# Patient Record
Sex: FEMALE | Race: WHITE | Employment: FULL TIME | URBAN - METROPOLITAN AREA
[De-identification: names, ages, dates, MRNs, and addresses within clinical notes are randomized per-mention and may not be internally consistent; named-entity substitution may affect disease eponyms.]

---

## 2017-01-04 ENCOUNTER — OFFICE VISIT (OUTPATIENT)
Dept: FAMILY MEDICINE CLINIC | Age: 57
End: 2017-01-04

## 2017-01-04 VITALS
DIASTOLIC BLOOD PRESSURE: 95 MMHG | TEMPERATURE: 96.4 F | OXYGEN SATURATION: 96 % | BODY MASS INDEX: 35.89 KG/M2 | WEIGHT: 223.3 LBS | RESPIRATION RATE: 16 BRPM | HEART RATE: 67 BPM | HEIGHT: 66 IN | SYSTOLIC BLOOD PRESSURE: 139 MMHG

## 2017-01-04 DIAGNOSIS — F32.89 DEPRESSIVE DISORDER, NOT ELSEWHERE CLASSIFIED: ICD-10-CM

## 2017-01-04 DIAGNOSIS — H10.31 ACUTE BACTERIAL CONJUNCTIVITIS OF RIGHT EYE: Primary | ICD-10-CM

## 2017-01-04 RX ORDER — SERTRALINE HYDROCHLORIDE 50 MG/1
75 TABLET, FILM COATED ORAL DAILY
Qty: 45 TAB | Refills: 5 | Status: SHIPPED | OUTPATIENT
Start: 2017-01-04 | End: 2017-08-23 | Stop reason: SDUPTHER

## 2017-01-04 RX ORDER — POLYMYXIN B SULFATE AND TRIMETHOPRIM 1; 10000 MG/ML; [USP'U]/ML
1 SOLUTION OPHTHALMIC EVERY 6 HOURS
Qty: 10 ML | Refills: 0 | Status: SHIPPED | OUTPATIENT
Start: 2017-01-04 | End: 2017-01-11

## 2017-01-04 NOTE — PROGRESS NOTES
Cathie Ruiz is a 55 y.o. female presents to office for right eye swollen, red and drainage. 1. Have you been to the ER, urgent care clinic or hospitalized since your last visit? no  2. Have you seen any other providers outside of [facility] since your last visit? no  3. Have you had a Flu shot this year?  yes       Health Maintenance items with a due date reviewed with patient:  Health Maintenance Due   Topic Date Due    Hepatitis C Screening  1960    BREAST CANCER SCRN MAMMOGRAM  11/05/2015    PAP AKA CERVICAL CYTOLOGY  11/05/2016

## 2017-01-04 NOTE — MR AVS SNAPSHOT
Visit Information Date & Time Provider Department Dept. Phone Encounter #  
 1/4/2017 11:45 AM Magalis Brennan, [address] [facility] [phone] [id number] Follow-up Instructions Return if symptoms worsen or fail to improve. Upcoming Health Maintenance Date Due Hepatitis C Screening 1960 BREAST CANCER SCRN MAMMOGRAM 11/5/2015 PAP AKA CERVICAL CYTOLOGY 11/5/2016 FOBT Q 1 YEAR AGE 50-75 3/17/2017 DTaP/Tdap/Td series (2 - Td) 9/6/2023 Allergies as of 1/4/2017  Review Complete On: 1/4/2017 By: CHRISTIAN Fall No Known Allergies Current Immunizations  Reviewed on 9/29/2016 Name Date Influenza Vaccine 9/26/2016, 11/11/2014 Tdap 9/6/2013  4:17 PM  
  
 Not reviewed this visit You Were Diagnosed With   
  
 Codes Comments Acute bacterial conjunctivitis of right eye    -  Primary ICD-10-CM: H10.31 ICD-9-CM: 372.03 Depressive disorder, not elsewhere classified     ICD-10-CM: F32.9 ICD-9-CM: 269 Vitals BP Pulse Temp Resp Height(growth percentile) Weight(growth percentile) (!) 139/95 (BP 1 Location: Right arm, BP Patient Position: Sitting) 67 96.4 °F (35.8 °C) (Oral) 16 5' 6\" (1.676 m) 223 lb 4.8 oz (101.3 kg) LMP SpO2 BMI OB Status Smoking Status 04/08/2012 96% 36.04 kg/m2 Postmenopausal Never Smoker BMI and BSA Data Body Mass Index Body Surface Area 36.04 kg/m 2 2.17 m 2 Preferred Pharmacy Pharmacy Name Phone [facility] [address] [facility] [phone] Your Updated Medication List  
  
   
This list is accurate as of: 1/4/17 12:06 PM.  Always use your most recent med list.  
  
  
  
  
 ALPRAZolam 0.5 mg tablet Commonly known as:  Janis Jammie Take 1 Tab by mouth three (3) times daily as needed for Anxiety. Max Daily Amount: 1.5 mg.  
  
 B COMPLEX PO Take 1 tablet by mouth daily. CALCIUM 500 + D 500 mg(1,250mg) -400 unit Tab Generic drug:  Calcium-Cholecalciferol (D3) Take 1,200 mg by mouth daily. cpap machine kit  
by Does Not Apply route. Change to auto-CPAP at 5-8 cm with humidifier. Mask of choice. Length of need 99 months. Replace mask and accessories as needed x 12 months. Download data at 30 days and fax at [phone]. Dx- GARCÍA, excessive sleepiness, obesity. [facility] DHEA 50 mg Cap Generic drug:  prasterone (dhea) Take 1 tablet by mouth daily. FISH OIL 1,200-144-216 mg Cap Generic drug:  fish oil-dha-epa Take 1 capsule by mouth daily. Glucosamine HCl 500 mg Tab Take 1 tablet by mouth daily. MULTIVITAMIN PO Take 1 tablet by mouth daily. naproxen 500 mg tablet Commonly known as:  NAPROSYN Take 1 Tab by mouth two (2) times daily (with meals). sertraline 50 mg tablet Commonly known as:  ZOLOFT Take 1.5 Tabs by mouth daily. trimethoprim-polymyxin b ophthalmic solution Commonly known as:  POLYTRIM Administer 1 Drop to right eye every six (6) hours for 7 days. Prescriptions Sent to Pharmacy Refills  
 trimethoprim-polymyxin b (POLYTRIM) ophthalmic solution 0 Sig: Administer 1 Drop to right eye every six (6) hours for 7 days. Class: Normal  
 Pharmacy: [id number] [place] [address] Hyun Garcia Ph #: [phone] Route: Right Eye  
 sertraline (ZOLOFT) 50 mg tablet 5 Sig: Take 1.5 Tabs by mouth daily. Class: Normal  
 Pharmacy: [address] [place] [address] Hyun Garcia Ph #: [phone] Route: Oral  
  
Follow-up Instructions Return if symptoms worsen or fail to improve. Patient Instructions Pinkeye: Care Instructions Your Care Instructions Pinkeye is redness and swelling of the eye surface and the conjunctiva (the lining of the eyelid and the covering of the white part of the eye). Pinkeye is also called conjunctivitis. Pinkeye is often caused by infection with bacteria or a virus. Dry air, allergies, smoke, and chemicals are other common causes. Pinkeye often clears on its own in 7 to 10 days. Antibiotics only help if the pinkeye is caused by bacteria. Pinkeye caused by infection spreads easily. If an allergy or chemical is causing pinkeye, it will not go away unless you can avoid whatever is causing it. Follow-up care is a key part of your treatment and safety. Be sure to make and go to all appointments, and call your doctor if you are having problems. Its also a good idea to know your test results and keep a list of the medicines you take. How can you care for yourself at home? · Wash your hands often. Always wash them before and after you treat pinkeye or touch your eyes or face. · Use moist cotton or a clean, wet cloth to remove crust. Wipe from the inside corner of the eye to the outside. Use a clean part of the cloth for each wipe. · Put cold or warm wet cloths on your eye a few times a day if the eye hurts. · Do not wear contact lenses or eye makeup until the pinkeye is gone. Throw away any eye makeup you were using when you got pinkeye. Clean your contacts and storage case. If you wear disposable contacts, use a new pair when your eye has cleared and it is safe to wear contacts again. · If the doctor gave you antibiotic ointment or eyedrops, use them as directed. Use the medicine for as long as instructed, even if your eye starts looking better soon. Keep the bottle tip clean, and do not let it touch the eye area. · To put in eyedrops or ointment: ¨ Tilt your head back, and pull your lower eyelid down with one finger. ¨ Drop or squirt the medicine inside the lower lid. ¨ Close your eye for 30 to 60 seconds to let the drops or ointment move around. ¨ Do not touch the ointment or dropper tip to your eyelashes or any other surface. · Do not share towels, pillows, or washcloths while you have pinkeye. When should you call for help? Call your doctor now or seek immediate medical care if: 
· You have pain in your eye, not just irritation on the surface. · You have a change in vision or loss of vision. · You have an increase in discharge from the eye. · Your eye has not started to improve or begins to get worse within 48 hours after you start using antibiotics. · Pinkeye lasts longer than 7 days. Watch closely for changes in your health, and be sure to contact your doctor if you have any problems. Where can you learn more? Go to http://jun-juan david.info/. Enter Y392 in the search box to learn more about \"Pinkeye: Care Instructions. \" Current as of: May 27, 2016 Content Version: 11.1 © 2214-0319 [employer]. Care instructions adapted under license by [employer] (which disclaims liability or warranty for this information). If you have questions about a medical condition or this instruction, always ask your healthcare professional. [address] any warranty or liability for your use of this information. Introducing [facility]! Dear Laura Casillas: Thank you for requesting a [employer] account. Our records indicate that you already have an active [employer] account. You can access your account anytime at [email or web address]. [employer] Did you know that you can access your hospital and ER discharge instructions at any time in [employer]? You can also review all of your test results from your hospital stay or ER visit. Additional Information If you have questions, please visit the Frequently Asked Questions section of the [employer] website at [email or web address]. [employer]/. Remember, [employer] is NOT to be used for urgent needs. For medical emergencies, dial 911. Now available from your iPhone and Android! Please provide this summary of care documentation to your next provider. Your primary care clinician is listed as Lizette Castaneda. If you have any questions after today's visit, please call [phone].

## 2017-01-04 NOTE — PATIENT INSTRUCTIONS
Pinkeye: Care Instructions  Your Care Instructions    Pinkeye is redness and swelling of the eye surface and the conjunctiva (the lining of the eyelid and the covering of the white part of the eye). Pinkeye is also called conjunctivitis. Pinkeye is often caused by infection with bacteria or a virus. Dry air, allergies, smoke, and chemicals are other common causes. Pinkeye often clears on its own in 7 to 10 days. Antibiotics only help if the pinkeye is caused by bacteria. Pinkeye caused by infection spreads easily. If an allergy or chemical is causing pinkeye, it will not go away unless you can avoid whatever is causing it. Follow-up care is a key part of your treatment and safety. Be sure to make and go to all appointments, and call your doctor if you are having problems. Its also a good idea to know your test results and keep a list of the medicines you take. How can you care for yourself at home? · Wash your hands often. Always wash them before and after you treat pinkeye or touch your eyes or face. · Use moist cotton or a clean, wet cloth to remove crust. Wipe from the inside corner of the eye to the outside. Use a clean part of the cloth for each wipe. · Put cold or warm wet cloths on your eye a few times a day if the eye hurts. · Do not wear contact lenses or eye makeup until the pinkeye is gone. Throw away any eye makeup you were using when you got pinkeye. Clean your contacts and storage case. If you wear disposable contacts, use a new pair when your eye has cleared and it is safe to wear contacts again. · If the doctor gave you antibiotic ointment or eyedrops, use them as directed. Use the medicine for as long as instructed, even if your eye starts looking better soon. Keep the bottle tip clean, and do not let it touch the eye area. · To put in eyedrops or ointment:  ¨ Tilt your head back, and pull your lower eyelid down with one finger.   ¨ Drop or squirt the medicine inside the lower lid.  ¨ Close your eye for 30 to 60 seconds to let the drops or ointment move around. ¨ Do not touch the ointment or dropper tip to your eyelashes or any other surface. · Do not share towels, pillows, or washcloths while you have pinkeye. When should you call for help? Call your doctor now or seek immediate medical care if:  · You have pain in your eye, not just irritation on the surface. · You have a change in vision or loss of vision. · You have an increase in discharge from the eye. · Your eye has not started to improve or begins to get worse within 48 hours after you start using antibiotics. · Pinkeye lasts longer than 7 days. Watch closely for changes in your health, and be sure to contact your doctor if you have any problems. Where can you learn more? Go to http://jun-juan david.info/. Enter Y392 in the search box to learn more about \"Pinkeye: Care Instructions. \"  Current as of: May 27, 2016  Content Version: 11.1  © 9758-8968 [employer]. Care instructions adapted under license by [employer] (which disclaims liability or warranty for this information). If you have questions about a medical condition or this instruction, always ask your healthcare professional. [address] any warranty or liability for your use of this information.

## 2017-01-04 NOTE — PROGRESS NOTES
HISTORY OF PRESENT ILLNESS  Corey Coad Bianca Jacobs is a 55 y.o. female. HPI Comments: Pt comes in today c/o red and swollen right eye. She says that it first started before joe and has been coming and going. She says that she has seen some clear discharge and putting in contacts makes it feel better. She says that it is not itchy but painful and swollen. She says that she accidentally fell asleep with her contacts in last night. She says that she has some photophobia as well. Pt requests refill of Zoloft. She says that she has been taking 75 mg daily and it has been working well for her. She denies fever, chills, sweats, N/V, chest pain, SOB, dizziness. Review of Systems   Constitutional: Negative for chills, diaphoresis, fever and malaise/fatigue. HENT: Negative for congestion, ear pain, hearing loss, nosebleeds and sore throat. Eyes: Positive for photophobia, pain, discharge and redness. Negative for blurred vision and double vision. Respiratory: Negative for cough, hemoptysis, sputum production, shortness of breath and wheezing. Cardiovascular: Negative for chest pain, palpitations and leg swelling. Gastrointestinal: Negative for abdominal pain, blood in stool, constipation, diarrhea, nausea and vomiting. Genitourinary: Negative for dysuria and hematuria. Musculoskeletal: Negative for back pain, myalgias and neck pain. Skin: Negative for rash. Neurological: Negative for dizziness, tingling, sensory change, seizures, loss of consciousness and headaches. Endo/Heme/Allergies: Does not bruise/bleed easily. Psychiatric/Behavioral: Negative for depression, memory loss and substance abuse. The patient is not nervous/anxious.       Past Medical History   Diagnosis Date    Depressive disorder, not elsewhere classified     Gynecologic exam normal      Dr. Yajaira Pinto H/O bone density study 9/2013     normal    Hip pain 9/2014    Osteophyte      bilat knees    Sleep apnea     TMJ (temporomandibular joint disorder)        Family History   Problem Relation Age of Onset    Thyroid Disease Mother     Pneumonia Mother     Dementia Father     Stroke Father     Diabetes Sister     Elevated Lipids Sister     Hypertension Sister     No Known Problems Brother     No Known Problems Brother     Heart Disease Maternal Grandmother          Heart Disease Maternal Aunt        Past Surgical History   Procedure Laterality Date    Hx orthopaedic       lipoma removed    Hc mammo diag bilat       11/10    Hx cystectomy       L shoulder       Social History     Social History    Marital status:      Spouse name: N/A    Number of children: N/A    Years of education: N/A     Occupational History    Not on file. Social History Main Topics    Smoking status: Never Smoker    Smokeless tobacco: Never Used    Alcohol use Yes      Comment: occasionally    Drug use: No    Sexual activity: Yes     Partners: Male     Birth control/ protection: None     Other Topics Concern     Service No    Blood Transfusions No    Caffeine Concern No    Occupational Exposure No    Hobby Hazards No    Sleep Concern No    Stress Concern No    Weight Concern Yes    Special Diet No    Back Care Yes    Exercise No    Seat Belt Yes    Self-Exams Yes     Social History Narrative       Current Outpatient Prescriptions   Medication Sig Dispense Refill    sertraline (ZOLOFT) 50 mg tablet TAKE ONE TABLET BY MOUTH ONCE DAILY (Patient taking differently: 75 mg. TAKE ONE TABLET BY MOUTH ONCE DAILY) 90 Tab 0    ALPRAZolam (XANAX) 0.5 mg tablet Take 1 Tab by mouth three (3) times daily as needed for Anxiety. Max Daily Amount: 1.5 mg. 45 Tab 0    cpap machine kit by Does Not Apply route. Change to auto-CPAP at 5-8 cm with humidifier. Mask of choice. Length of need 99 months. Replace mask and accessories as needed x 12 months. Download data at 30 days and fax at [phone].  Dx- GARCÍA, excessive sleepiness, obesity. DME- [facility] 1 Kit 0    prasterone, dhea, (DHEA) 50 mg cap Take 1 tablet by mouth daily.  VITAMIN B COMPLEX (B COMPLEX PO) Take 1 tablet by mouth daily.  Glucosamine HCl 500 mg Tab Take 1 tablet by mouth daily.  Calcium-Cholecalciferol, D3, (CALCIUM 500 + D) 500 mg(1,250mg) -400 unit Tab Take 1,200 mg by mouth daily.  fish oil-dha-epa (FISH OIL) 1,200-144-216 mg Cap Take 1 capsule by mouth daily.  MULTIVITAMIN PO Take 1 tablet by mouth daily.  naproxen (NAPROSYN) 500 mg tablet Take 1 Tab by mouth two (2) times daily (with meals). 60 Tab 0       No Known Allergies    Visit Vitals    BP (!) 139/95 (BP 1 Location: Right arm, BP Patient Position: Sitting)    Pulse 67    Temp 96.4 °F (35.8 °C) (Oral)    Resp 16    Ht 5' 6\" (1.676 m)    Wt 223 lb 4.8 oz (101.3 kg)    SpO2 96%    BMI 36.04 kg/m2       Physical Exam   Constitutional: She is oriented to person, place, and time. She appears well-developed and well-nourished. No distress. HENT:   Head: Normocephalic and atraumatic. Right Ear: External ear normal.   Left Ear: External ear normal.   Nose: Nose normal.   Mouth/Throat: Oropharynx is clear and moist. No oropharyngeal exudate. Eyes: Pupils are equal, round, and reactive to light. Right eye exhibits discharge. Left eye exhibits no discharge. Mildly erythematous conjunctiva of right eye, edema of upper and lower lid, clear/watery discharge noted, TTP   Neck: Normal range of motion. Neck supple. No tracheal deviation present. No thyromegaly present. Cardiovascular: Normal rate, regular rhythm and normal heart sounds. Exam reveals no gallop and no friction rub. No murmur heard. Pulmonary/Chest: Effort normal and breath sounds normal. No respiratory distress. She has no wheezes. She has no rales. Abdominal: Soft. Bowel sounds are normal. She exhibits no distension and no mass. There is no tenderness.  There is no rebound and no guarding. Musculoskeletal: Normal range of motion. She exhibits no edema or tenderness. Lymphadenopathy:     She has no cervical adenopathy. Neurological: She is alert and oriented to person, place, and time. Coordination normal.   Skin: Skin is warm and dry. No rash noted. She is not diaphoretic. No erythema. Psychiatric: She has a normal mood and affect. Her behavior is normal. Judgment and thought content normal.       ASSESSMENT and PLAN   1. Acute bacterial conjunctivitis of right eye  - trimethoprim-polymyxin b (POLYTRIM) ophthalmic solution; Administer 1 Drop to right eye every six (6) hours for 7 days. Dispense: 10 mL; Refill: 0  - warm compresses to help with pain  - OTC Tylenol as needed    2. Depressive disorder, not elsewhere classified  - sertraline (ZOLOFT) 50 mg tablet; Take 1.5 Tabs by mouth daily. Dispense: 45 Tab; Refill: 5    - follow up as needed    Plan and reference materials were reviewed with the patient and the patient expressed understanding. Patient instructed that if symptoms/condition worsens or fails to resolve to come back to the office or go to the emergency room.     Bandar Ortiz, [address]

## 2017-08-23 ENCOUNTER — OFFICE VISIT (OUTPATIENT)
Dept: FAMILY MEDICINE CLINIC | Age: 57
End: 2017-08-23

## 2017-08-23 VITALS
DIASTOLIC BLOOD PRESSURE: 71 MMHG | BODY MASS INDEX: 36 KG/M2 | TEMPERATURE: 98.8 F | SYSTOLIC BLOOD PRESSURE: 121 MMHG | WEIGHT: 224 LBS | HEIGHT: 66 IN | HEART RATE: 87 BPM | OXYGEN SATURATION: 98 % | RESPIRATION RATE: 16 BRPM

## 2017-08-23 DIAGNOSIS — F32.89 DEPRESSIVE DISORDER, NOT ELSEWHERE CLASSIFIED: ICD-10-CM

## 2017-08-23 DIAGNOSIS — R23.8 SKIN IRRITATION: Primary | ICD-10-CM

## 2017-08-23 RX ORDER — NYSTATIN AND TRIAMCINOLONE ACETONIDE 100000; 1 [USP'U]/G; MG/G
CREAM TOPICAL 2 TIMES DAILY
Qty: 30 G | Refills: 0 | Status: SHIPPED | OUTPATIENT
Start: 2017-08-23 | End: 2017-09-22

## 2017-08-23 RX ORDER — MUPIROCIN 20 MG/G
OINTMENT TOPICAL DAILY
Qty: 22 G | Refills: 0 | Status: SHIPPED | OUTPATIENT
Start: 2017-08-23 | End: 2018-03-23 | Stop reason: ALTCHOICE

## 2017-08-23 RX ORDER — SERTRALINE HYDROCHLORIDE 50 MG/1
TABLET, FILM COATED ORAL
Qty: 60 TAB | Refills: 5 | Status: SHIPPED | OUTPATIENT
Start: 2017-08-23 | End: 2018-03-23 | Stop reason: SDUPTHER

## 2017-08-23 NOTE — PROGRESS NOTES
Franklin Costa is a 65 y.o. female presents in office to be seen for skin issues and med refill. Health Maintenance Due   Topic Date Due    Hepatitis C Screening  1960    BREAST CANCER SCRN MAMMOGRAM  11/05/2015    PAP AKA CERVICAL CYTOLOGY  11/05/2016    FOBT Q 1 YEAR AGE 50-75  03/17/2017    INFLUENZA AGE 9 TO ADULT  08/01/2017       1. Have you been to the ER, urgent care clinic since your last visit? Hospitalized since your last visit?no    2. Have you seen or consulted any other health care providers outside of the [address] [place] [address] since your last visit? Include any pap smears or colon screening.  no

## 2017-08-23 NOTE — PATIENT INSTRUCTIONS
Learning About Depression Screening  What is depression screening? Depression screening is a way to see if you have depression symptoms. It may be done by a doctor or counselor. This screening is often part of a routine checkup. That's because your mental health is just as important as your physical health. Depression is a medical illness that affects how you feel, think, and act. You may:  · Have less energy. · Lose interest in your daily activities. · Feel sad and grouchy for a long time. Depression is very common. It affects men and women of all ages. Many things can trigger depression. Some people become depressed after they have a stroke or find out they have a major illness like cancer or heart disease. The death of a loved one, a breakup, or changes in the natural brain chemicals may lead to depression. It can run in families. Most experts believe that a combination of family history (a person's genes) and stressful life events can cause depression. What happens during screening? Your doctor may ask about your feelings, any changes in eating habits, your energy level, and your interest in your daily tasks. He or she may ask other questions, such as how well you are sleeping and if you can focus on the things you do. This may be an informal talk between the two of you. Or your doctor may ask you to fill out a quick form and then talk about your answers. Some diseases or changes in your body can cause symptoms that look like depression. So your doctor may do blood tests to help rule out other problems, such as hormone changes, a low thyroid level, or anemia. What happens after screening? If you have signs of depression, your doctor will talk to you about your options. Doctors usually treat depression with medicines or counseling. Often, combining the two works best. Many people don't get help because they think that they'll get over the depression on their own.  But people with depression may not get better unless they get treatment. Many people feel embarrassed or ashamed about having depression. But it isn't a sign of personal weakness. It's not a character flaw. A person who is depressed is not \"crazy. \" Depression is caused by changes in the brain. A serious symptom of depression is thinking about death or suicide. If you or someone you care about talks about this or about feeling hopeless, get help right away. It's important to know that depression can be treated. The first step toward feeling better is often just seeing that the problem exists. Where can you learn more? Go to http://junDatawatch Corpjuan david.info/. Enter T185 in the search box to learn more about \"Learning About Depression Screening. \"  Current as of: October 28, 2016  Content Version: 11.3  © 0811-4960 [employer]. Care instructions adapted under license by "[employer] (which disclaims liability or warranty for this information). If you have questions about a medical condition or this instruction, always ask your healthcare professional. [place] [address] any warranty or liability for your use of this information. Impetigo: Care Instructions  Your Care Instructions  Impetigo (say \"qh-zxf-WB-go\") is a skin infection caused by bacteria. It causes blisters that break and become oozing, yellow, crusty sores. Impetigo can be anywhere on the body. Scratching the sores may spread the infection to other parts of the body. You can also spread it to others through close contact or when you share towels, clothing, and other items. Prescription antibiotic ointment or pills can usually cure impetigo. (After a day of antibiotics, the infection should not spread.)  Follow-up care is a key part of your treatment and safety. Be sure to make and go to all appointments, and call your doctor if you are having problems.  It's also a good idea to know your test results and keep a list of the medicines you take. How can you care for yourself at home? · Apply antibiotic ointment exactly as instructed. · If your doctor prescribed antibiotic pills, take them as directed. Do not stop using them just because you feel better. You need to take the full course of antibiotics. · Gently wash the sores with soap and water each day. If crusts form, your doctor may advise you to soften or remove the crusts. You can do this by soaking them in warm water and patting them dry. This can help the cream or ointment treat impetigo. · After you touch the area, wash your hands with soap and water. Or you can use an alcohol-based hand . · Don't share items such as towels, sheets, and clothing until the infection is gone. · Wash anything that may have touched the infected area. · Try to avoid scratching the area. When should you call for help? Call your doctor now or seek immediate medical care if:  · You have symptoms of a worse infection, such as:  ¨ Increased pain, swelling, warmth, or redness. ¨ Red streaks leading from the area. ¨ Pus draining from the area. ¨ A fever. · Impetigo gets worse or spreads to other areas. Watch closely for changes in your health, and be sure to contact your doctor if:  · You do not get better as expected. Where can you learn more? Go to http://jun-juan david.info/. Enter V018 in the search box to learn more about \"Impetigo: Care Instructions. \"  Current as of: July 26, 2016  Content Version: 11.3  © 0067-6215 [employer]. Care instructions adapted under license by [employer] (which disclaims liability or warranty for this information). If you have questions about a medical condition or this instruction, always ask your healthcare professional. [address] any warranty or liability for your use of this information.

## 2017-08-23 NOTE — MR AVS SNAPSHOT
Visit Information Date & Time Provider Department Dept. Phone Encounter #  
 8/23/2017  1:45 PM Jocy Mccoy, [address] [facility] [phone] [id number] Follow-up Instructions Return in about 6 months (around 2/23/2018). Upcoming Health Maintenance Date Due Hepatitis C Screening 1960 BREAST CANCER SCRN MAMMOGRAM 11/5/2015 PAP AKA CERVICAL CYTOLOGY 11/5/2016 FOBT Q 1 YEAR AGE 50-75 3/17/2017 INFLUENZA AGE 9 TO ADULT 8/1/2017 DTaP/Tdap/Td series (2 - Td) 9/6/2023 Allergies as of 8/23/2017  Review Complete On: 8/23/2017 By: CHRISTIAN Tilley No Known Allergies Current Immunizations  Reviewed on 9/29/2016 Name Date Influenza Vaccine 9/26/2016, 11/11/2014 Tdap 9/6/2013  4:17 PM  
  
 Not reviewed this visit You Were Diagnosed With   
  
 Codes Comments Skin irritation    -  Primary ICD-10-CM: R23.8 ICD-9-CM: 709.9 Depressive disorder, not elsewhere classified     ICD-10-CM: F32.9 ICD-9-CM: 408 Vitals BP Pulse Temp Resp Height(growth percentile) Weight(growth percentile) 121/71 87 98.8 °F (37.1 °C) (Oral) 16 5' 5.98\" (1.676 m) 224 lb (101.6 kg) LMP SpO2 BMI OB Status Smoking Status 04/08/2012 98% 36.17 kg/m2 Postmenopausal Never Smoker Vitals History BMI and BSA Data Body Mass Index Body Surface Area  
 36.17 kg/m 2 2.17 m 2 Preferred Pharmacy Pharmacy Name Phone [facility] [address] [place] [address] Reece Patelke [phone] Your Updated Medication List  
  
   
This list is accurate as of: 8/23/17  2:49 PM.  Always use your most recent med list.  
  
  
  
  
 ALPRAZolam 0.5 mg tablet Commonly known as:  Rand  Take 1 Tab by mouth three (3) times daily as needed for Anxiety. Max Daily Amount: 1.5 mg.  
  
 B COMPLEX PO Take 1 tablet by mouth daily. CALCIUM 500 + D 500 mg(1,250mg) -400 unit Tab Generic drug:  Calcium-Cholecalciferol (D3) Take 1,200 mg by mouth daily. cpap machine kit  
by Does Not Apply route. Change to auto-CPAP at 5-8 cm with humidifier. Mask of choice. Length of need 99 months. Replace mask and accessories as needed x 12 months. Download data at 30 days and fax at [phone]. Dx- GARCÍA, excessive sleepiness, obesity. DME- [place] DHEA 50 mg Cap Generic drug:  prasterone (dhea) Take 1 tablet by mouth daily. FISH OIL 1,200-144-216 mg Cap Generic drug:  fish oil-dha-epa Take 1 capsule by mouth daily. Glucosamine HCl 500 mg Tab Take 1 tablet by mouth daily. MULTIVITAMIN PO Take 1 tablet by mouth daily. naproxen 500 mg tablet Commonly known as:  NAPROSYN Take 1 Tab by mouth two (2) times daily (with meals). nystatin-triamcinolone topical cream  
Commonly known as:  MYCOLOG II Apply  to affected area two (2) times a day for 30 days. sertraline 50 mg tablet Commonly known as:  ZOLOFT Take 2 tabs daily for 30 days Prescriptions Sent to Pharmacy Refills  
 sertraline (ZOLOFT) 50 mg tablet 5 Sig: Take 2 tabs daily for 30 days Class: Normal  
 [address] #: [phone]  
 nystatin-triamcinolone (MYCOLOG II) topical cream 0 Sig: Apply  to affected area two (2) times a day for 30 days. Class: Normal  
 [address] Ph #: [phone] Route: Topical  
  
Follow-up Instructions Return in about 6 months (around 2/23/2018). Patient Instructions Learning About Depression Screening What is depression screening? Depression screening is a way to see if you have depression symptoms. It may be done by a doctor or counselor. This screening is often part of a routine checkup.  That's because your mental health is just as important as your physical health. Depression is a medical illness that affects how you feel, think, and act. You may: 
· Have less energy. · Lose interest in your daily activities. · Feel sad and grouchy for a long time. Depression is very common. It affects men and women of all ages. Many things can trigger depression. Some people become depressed after they have a stroke or find out they have a major illness like cancer or heart disease. The death of a loved one, a breakup, or changes in the natural brain chemicals may lead to depression. It can run in families. Most experts believe that a combination of family history (a person's genes) and stressful life events can cause depression. What happens during screening? Your doctor may ask about your feelings, any changes in eating habits, your energy level, and your interest in your daily tasks. He or she may ask other questions, such as how well you are sleeping and if you can focus on the things you do. This may be an informal talk between the two of you. Or your doctor may ask you to fill out a quick form and then talk about your answers. Some diseases or changes in your body can cause symptoms that look like depression. So your doctor may do blood tests to help rule out other problems, such as hormone changes, a low thyroid level, or anemia. What happens after screening? If you have signs of depression, your doctor will talk to you about your options. Doctors usually treat depression with medicines or counseling. Often, combining the two works best. Many people don't get help because they think that they'll get over the depression on their own. But people with depression may not get better unless they get treatment. Many people feel embarrassed or ashamed about having depression. But it isn't a sign of personal weakness. It's not a character flaw. A person who is depressed is not \"crazy. \" Depression is caused by changes in the brain. A serious symptom of depression is thinking about death or suicide. If you or someone you care about talks about this or about feeling hopeless, get help right away. It's important to know that depression can be treated. The first step toward feeling better is often just seeing that the problem exists. Where can you learn more? Go to http://jun-juan david.info/. Enter T185 in the search box to learn more about \"Learning About Depression Screening. \" Current as of: October 28, 2016 Content Version: 11.3 © 3826-3266 [employer]. Care instructions adapted under license by [employer] (which disclaims liability or warranty for this information). If you have questions about a medical condition or this instruction, always ask your healthcare professional. [address] any warranty or liability for your use of this information. Impetigo: Care Instructions Your Care Instructions Impetigo (say \"cv-glo-KO-go\") is a skin infection caused by bacteria. It causes blisters that break and become oozing, yellow, crusty sores. Impetigo can be anywhere on the body. Scratching the sores may spread the infection to other parts of the body. You can also spread it to others through close contact or when you share towels, clothing, and other items. Prescription antibiotic ointment or pills can usually cure impetigo. (After a day of antibiotics, the infection should not spread.) Follow-up care is a key part of your treatment and safety. Be sure to make and go to all appointments, and call your doctor if you are having problems. It's also a good idea to know your test results and keep a list of the medicines you take. How can you care for yourself at home? · Apply antibiotic ointment exactly as instructed. · If your doctor prescribed antibiotic pills, take them as directed. Do not stop using them just because you feel better.  You need to take the full course of antibiotics. · Gently wash the sores with soap and water each day. If crusts form, your doctor may advise you to soften or remove the crusts. You can do this by soaking them in warm water and patting them dry. This can help the cream or ointment treat impetigo. · After you touch the area, wash your hands with soap and water. Or you can use an alcohol-based hand . · Don't share items such as towels, sheets, and clothing until the infection is gone. · Wash anything that may have touched the infected area. · Try to avoid scratching the area. When should you call for help? Call your doctor now or seek immediate medical care if: 
· You have symptoms of a worse infection, such as: 
¨ Increased pain, swelling, warmth, or redness. ¨ Red streaks leading from the area. ¨ Pus draining from the area. ¨ A fever. · Impetigo gets worse or spreads to other areas. Watch closely for changes in your health, and be sure to contact your doctor if: 
· You do not get better as expected. Where can you learn more? Go to http://jun-juan david.info/. Enter M711 in the search box to learn more about \"Impetigo: Care Instructions. \" Current as of: July 26, 2016 Content Version: 11.3 © 9646-4583 [employer]. Care instructions adapted under license by [employer] (which disclaims liability or warranty for this information). If you have questions about a medical condition or this instruction, always ask your healthcare professional. [place] [address] any warranty or liability for your use of this information. Introducing [facility] & HEALTH SERVICES! Dear [facility]: Thank you for requesting a [employer] account. Our records indicate that you already have an active [employer] account. You can access your account anytime at [email or web address] Did you know that you can access your hospital and ER discharge instructions at any time in [employer]? You can also review all of your test results from your hospital stay or ER visit. Additional Information If you have questions, please visit the Frequently Asked Questions section of the [employer] website at [email or web address] Remember, [employer] is NOT to be used for urgent needs. For medical emergencies, dial 911. Now available from your iPhone and Android! Please provide this summary of care documentation to your next provider. Your primary care clinician is listed as Ousmane Vazquez. If you have any questions after today's visit, please call [phone].

## 2017-08-28 NOTE — PROGRESS NOTES
HISTORY OF PRESENT ILLNESS  Noel Sherman is a 65 y.o. femalewho presents for depression f/u and right skin irritation. She is currently taking Zoloft and feels like it works well for her. She is almost out of the medication therefore is requesting a refill. She has some dry, itchy skin with peeling and intermittent crusting on the posterior aspect of right ear and this first developed a couple of weeks ago. She is unaware of anything that came into contact to the area that would have caused a localized reaction or possible infection. No history of eczema. Medication Refill   The history is provided by the patient. This is a recurrent problem. The problem occurs daily. The problem has not changed since onset. Pertinent negatives include no chest pain and no shortness of breath. The symptoms are aggravated by stress. The symptoms are relieved by medications. Review of Systems   Constitutional: Negative for chills and fever. HENT: Negative for ear pain and tinnitus. Respiratory: Negative for shortness of breath. Cardiovascular: Negative for chest pain. Skin: Positive for itching. Psychiatric/Behavioral: Positive for depression. Past Medical History:   Diagnosis Date    Depressive disorder, not elsewhere classified     Gynecologic exam normal     Dr. Oswald Carter H/O bone density study 9/2013    normal    Hip pain 9/2014    Osteophyte     bilat knees    Sleep apnea     TMJ (temporomandibular joint disorder)      No Known Allergies      Current Outpatient Prescriptions:     sertraline (ZOLOFT) 50 mg tablet, Take 2 tabs daily for 30 days, Disp: 60 Tab, Rfl: 5    nystatin-triamcinolone (MYCOLOG II) topical cream, Apply  to affected area two (2) times a day for 30 days. , Disp: 30 g, Rfl: 0    mupirocin (BACTROBAN) 2 % ointment, Apply  to affected area daily. , Disp: 22 g, Rfl: 0    naproxen (NAPROSYN) 500 mg tablet, Take 1 Tab by mouth two (2) times daily (with meals). , Disp: 60 Tab, Rfl: 0    cpap machine kit, by Does Not Apply route. Change to auto-CPAP at 5-8 cm with humidifier. Mask of choice. Length of need 99 months. Replace mask and accessories as needed x 12 months. Download data at 30 days and fax at [phone]. Dx- GARCÍA, excessive sleepiness, obesity. DME- [facility], Disp: 1 Kit, Rfl: 0    prasterone, dhea, (DHEA) 50 mg cap, Take 1 tablet by mouth daily. , Disp: , Rfl:     VITAMIN B COMPLEX (B COMPLEX PO), Take 1 tablet by mouth daily. , Disp: , Rfl:     Glucosamine HCl 500 mg Tab, Take 1 tablet by mouth daily. , Disp: , Rfl:     Calcium-Cholecalciferol, D3, (CALCIUM 500 + D) 500 mg(1,250mg) -400 unit Tab, Take 1,200 mg by mouth daily. , Disp: , Rfl:     fish oil-dha-epa (FISH OIL) 1,200-144-216 mg Cap, Take 1 capsule by mouth daily. , Disp: , Rfl:     MULTIVITAMIN PO, Take 1 tablet by mouth daily. , Disp: , Rfl:     ALPRAZolam (XANAX) 0.5 mg tablet, Take 1 Tab by mouth three (3) times daily as needed for Anxiety. Max Daily Amount: 1.5 mg., Disp: 45 Tab, Rfl: 0      Objective  Visit Vitals    /71    Pulse 87    Temp 98.8 °F (37.1 °C) (Oral)    Resp 16    Ht 5' 5.98\" (1.676 m)    Wt 224 lb (101.6 kg)    LMP 04/08/2012    SpO2 98%    BMI 36.17 kg/m2       Physical Exam   Constitutional: She is oriented to person, place, and time. She appears well-developed and well-nourished. HENT:   Right Ear: External ear normal.   Left Ear: External ear normal.   Mouth/Throat: Oropharynx is clear and moist.   Cardiovascular: Normal rate and regular rhythm. No murmur heard. Pulmonary/Chest: Effort normal and breath sounds normal.   Neurological: She is alert and oriented to person, place, and time. Skin:   Vertical area on posterior aspect of right ear on the crease of the ear adjacent to the head of dry flaky skin with it being faint pinkish red. No honey colored crusting or warmth or swelling. Mildly tender.          ASSESSMENT and PLAN    ICD-10-CM ICD-9-CM    1. Skin irritation R23.8 709.9 nystatin-triamcinolone (MYCOLOG II) topical cream   2. Depressive disorder, not elsewhere classified F32.9 311 sertraline (ZOLOFT) 50 mg tablet     Patient reassured that the back of her ear does look irritated and may have a mild local infection but not serious enough to warrant an oral antibiotic. She is placed on Mycolog cream and is to apply antibiotic ointment to the area once a day, preferably in the middle of the day since the other medication if twice daily. Patient verbalizes understanding and agrees with plan.

## 2017-12-21 ENCOUNTER — OFFICE VISIT (OUTPATIENT)
Dept: FAMILY MEDICINE CLINIC | Age: 57
End: 2017-12-21

## 2017-12-21 VITALS
BODY MASS INDEX: 36 KG/M2 | TEMPERATURE: 96 F | HEART RATE: 88 BPM | OXYGEN SATURATION: 98 % | RESPIRATION RATE: 16 BRPM | HEIGHT: 66 IN | WEIGHT: 224 LBS | DIASTOLIC BLOOD PRESSURE: 82 MMHG | SYSTOLIC BLOOD PRESSURE: 142 MMHG

## 2017-12-21 DIAGNOSIS — R21 RASH: Primary | ICD-10-CM

## 2017-12-21 RX ORDER — VALACYCLOVIR HYDROCHLORIDE 500 MG/1
500 TABLET, FILM COATED ORAL 2 TIMES DAILY
Qty: 20 TAB | Refills: 0 | Status: SHIPPED | OUTPATIENT
Start: 2017-12-21 | End: 2017-12-31

## 2017-12-21 RX ORDER — PREDNISONE 10 MG/1
10 TABLET ORAL
Qty: 21 TAB | Refills: 0 | Status: SHIPPED | OUTPATIENT
Start: 2017-12-21 | End: 2017-12-28

## 2017-12-21 NOTE — MR AVS SNAPSHOT
Visit Information Date & Time Provider Department Dept. Phone Encounter #  
 12/21/2017 10:45 AM Edison Jackson, [address] [phone] [id number] Upcoming Health Maintenance Date Due Hepatitis C Screening 1960 PAP AKA CERVICAL CYTOLOGY 11/5/2016 FOBT Q 1 YEAR AGE 50-75 3/17/2017 Influenza Age 5 to Adult 8/1/2017 DTaP/Tdap/Td series (2 - Td) 9/6/2023 Allergies as of 12/21/2017  Review Complete On: 12/21/2017 By: Shasha Viera LPN No Known Allergies Current Immunizations  Reviewed on 9/29/2016 Name Date Influenza Vaccine 9/26/2016, 11/11/2014 Tdap 9/6/2013  4:17 PM  
  
 Not reviewed this visit You Were Diagnosed With   
  
 Codes Comments Rash    -  Primary ICD-10-CM: R21 
ICD-9-CM: 782.1 Vitals BP Pulse Temp Resp Height(growth percentile) Weight(growth percentile) 142/82 88 96 °F (35.6 °C) (Oral) 16 5' 5.98\" (1.676 m) 224 lb (101.6 kg) LMP SpO2 BMI OB Status Smoking Status 04/08/2012 98% 36.17 kg/m2 Postmenopausal Never Smoker Vitals History BMI and BSA Data Body Mass Index Body Surface Area  
 36.17 kg/m 2 2.17 m 2 Preferred Pharmacy Pharmacy Name Phone [facility] [address] [place] [address] [phone] Your Updated Medication List  
  
   
This list is accurate as of: 12/21/17 11:30 AM.  Always use your most recent med list.  
  
  
  
  
 ALPRAZolam 0.5 mg tablet Commonly known as:  Sharnuno Kida Take 1 Tab by mouth three (3) times daily as needed for Anxiety. Max Daily Amount: 1.5 mg.  
  
 B COMPLEX PO Take 1 tablet by mouth daily. CALCIUM 500 + D 500 mg(1,250mg) -400 unit Tab Generic drug:  Calcium-Cholecalciferol (D3) Take 1,200 mg by mouth daily. cpap machine kit  
by Does Not Apply route. Change to auto-CPAP at 5-8 cm with humidifier. Mask of choice. Length of need 99 months. Replace mask and accessories as needed x 12 months. Download data at 30 days and fax at [phone]. Dx- GARCÍA, excessive sleepiness, obesity. [facility] [place] DHEA 50 mg Cap Generic drug:  prasterone (dhea) Take 1 tablet by mouth daily. FISH OIL 1,200-144-216 mg Cap Generic drug:  fish oil-dha-epa Take 1 capsule by mouth daily. Glucosamine HCl 500 mg Tab Take 1 tablet by mouth daily. MULTIVITAMIN PO Take 1 tablet by mouth daily. mupirocin 2 % ointment Commonly known as:  [employer] Apply  to affected area daily. naproxen 500 mg tablet Commonly known as:  NAPROSYN Take 1 Tab by mouth two (2) times daily (with meals). predniSONE 10 mg tablet Commonly known as:  Conner Sprawls Take 1 Tab by mouth three (3) times daily (after meals) for 7 days. sertraline 50 mg tablet Commonly known as:  ZOLOFT Take 2 tabs daily for 30 days  
  
 valACYclovir 500 mg tablet Commonly known as:  VALTREX Take 1 Tab by mouth two (2) times a day for 10 days. VITAMIN C PO Take  by mouth. Prescriptions Sent to Pharmacy Refills  
 valACYclovir (VALTREX) 500 mg tablet 0 Sig: Take 1 Tab by mouth two (2) times a day for 10 days. Class: Normal  
 Pharmacy: [address] [place] [address] Poncho [place]seywa Ph #: [phone] Route: Oral  
 predniSONE (DELTASONE) 10 mg tablet 0 Sig: Take 1 Tab by mouth three (3) times daily (after meals) for 7 days. Class: Normal  
 Pharmacy: [id number] [place] [address] Pon[facility] [place] Ph #: [phone] Route: Oral  
  
Patient Instructions Shingles: Care Instructions Your Care Instructions Shingles (herpes zoster) causes pain and a blistered rash. The rash can appear anywhere on the body but will be on only one side of the body, the left or right. It will be in a band, a strip, or a small area.  The pain can be very severe. Shingles can also cause tingling or itching in the area of the rash. The blisters scab over after a few days and heal in 2 to 4 weeks. Medicines can help you feel better and may help prevent more serious problems caused by shingles. Shingles is caused by the same virus that causes chickenpox. When you have chickenpox, the virus gets into your nerve roots and stays there (becomes dormant) long after you get over the chickenpox. If the virus becomes active again, it can cause shingles. Follow-up care is a key part of your treatment and safety. Be sure to make and go to all appointments, and call your doctor if you are having problems. It's also a good idea to know your test results and keep a list of the medicines you take. How can you care for yourself at home? · Be safe with medicines. Take your medicines exactly as prescribed. Call your doctor if you think you are having a problem with your medicine. Antiviral medicine helps you get better faster. · Try not to scratch or pick at the blisters. They will crust over and fall off on their own if you leave them alone. · Put cool, wet cloths on the area to relieve pain and itching. You can also use calamine lotion. Try not to use so much lotion that it cakes and is hard to get off. · Put cornstarch or baking soda on the sores to help dry them out so they heal faster. · Do not use thick ointment, such as petroleum jelly, on the sores. This will keep them from drying and healing. · To help remove loose crusts, soak them in tap water. This can help decrease oozing, and dry and soothe the skin. · Take an over-the-counter pain medicine, such as acetaminophen (Tylenol), ibuprofen (Advil, Motrin), or naproxen (Aleve). Read and follow all instructions on the label. · Avoid close contact with people until the blisters have healed.  It is very important for you to avoid contact with anyone who has never had chickenpox or the chickenpox vaccine. Pregnant women, young babies, and anyone else who has a hard time fighting infection (such as someone with HIV, diabetes, or cancer) is especially at risk. When should you call for help? Call your doctor now or seek immediate medical care if: 
? · You have a new or higher fever. ? · You have a severe headache and a stiff neck. ? · You lose the ability to think clearly. ? · The rash spreads to your forehead, nose, eyes, or eyelids. ? · You have eye pain, or your vision gets worse. ? · You have new pain in your face, or you cannot move the muscles in your face. ? · Blisters spread to new parts of your body. ? Watch closely for changes in your health, and be sure to contact your doctor if: 
? · The rash has not healed after 2 to 4 weeks. ? · You still have pain after the rash has healed. Where can you learn more? Go to http://jun-juan david.info/. Erlinda Virk in the search box to learn more about \"Shingles: Care Instructions. \" Current as of: March 3, 2017 Content Version: 11.4 © 7666-1701 [employer]. Care instructions adapted under license by [employer] (which disclaims liability or warranty for this information). If you have questions about a medical condition or this instruction, always ask your healthcare professional. [address] any warranty or liability for your use of this information. Introducing [facility]! Dear [facility]: Thank you for requesting a [employer] account. Our records indicate that you already have an active [employer] account. You can access your account anytime at [email or web address] Did you know that you can access your hospital and ER discharge instructions at any time in [employer]? You can also review all of your test results from your hospital stay or ER visit. Additional Information If you have questions, please visit the Frequently Asked Questions section of the [email or web address] website at [email or web address] Remember, [facility] is NOT to be used for urgent needs. For medical emergencies, dial 911. Now available from your iPhone and Android! Please provide this summary of care documentation to your next provider. Your primary care clinician is listed as Alondra Matthews. If you have any questions after today's visit, please call [phone].

## 2017-12-21 NOTE — PROGRESS NOTES
Nirali Miguel is a 65 y.o. female presents in office to be seen for rash on left buttock. Health Maintenance Due   Topic Date Due    Hepatitis C Screening  1960    PAP AKA CERVICAL CYTOLOGY  11/05/2016    FOBT Q 1 YEAR AGE 50-75  03/17/2017    Influenza Age 9 to Adult  08/01/2017       1. Have you been to the ER, urgent care clinic since your last visit? Hospitalized since your last visit?no    2. Have you seen or consulted any other health care providers outside of the [address] [place] [address] since your last visit? Include any pap smears or colon screening.  no

## 2017-12-21 NOTE — PROGRESS NOTES
HISTORY OF PRESENT ILLNESS  Kim Her is a 65 y.o. female who presents to [place] with a painful rash on the left buttock. About 2 days ago she began having mild occasional pain medial aspect the left upper buttock and when she would sit down and leaned back on that area it would be very sore. Later that day than she felt the skin a raised a little bit where she was sore and then when she looked in the mirror she saw there was a red area. She is continued to have almost constant soreness and intermittent sharp brief pain of the left side of the buttock that will and occasionally radiate down the left leg although faintly. The pain varies between a 2 out of 10 and a 4 out of 10 and she describes it as a nervelike pain. Since the initial day of noticing the localized area of the rash she has developed more of the rash down the buttock. Denies any fever. Denies any injury to the buttock and denies any known irritants that would cause the rash. She did have chickenpox as a child. Rash    The history is provided by the patient. This is a new problem. The current episode started 2 days ago. The problem has been gradually worsening. The problem is associated with nothing. There has been no fever. The rash is present on the left buttock. The pain is at a severity of 4/10. Associated symptoms include pain. Pertinent negatives include no blisters. She has tried nothing for the symptoms.      Visit Vitals    /82    Pulse 88    Temp 96 °F (35.6 °C) (Oral)    Resp 16    Ht 5' 5.98\" (1.676 m)    Wt 224 lb (101.6 kg)    LMP 04/08/2012    SpO2 98%    BMI 36.17 kg/m2     Past Medical History:   Diagnosis Date    Depressive disorder, not elsewhere classified     Gynecologic exam normal     Dr. Mary Kay Velasquez H/O bone density study 9/2013    normal    Hip pain 9/2014    Osteophyte     bilat knees    Sleep apnea     TMJ (temporomandibular joint disorder)          Review of Systems   Constitutional: Negative for chills and fever. Musculoskeletal:        Left buttock pain and occasional radiation of pain down left leg. Mild left lower back ache. Skin: Positive for rash. Neurological: Negative for sensory change and focal weakness. Physical Exam   Constitutional: She appears well-developed and well-nourished. No distress. Cardiovascular: Intact distal pulses. Musculoskeletal:   Mild left lower lumbar medial musculature tenderness without obvious swelling. Full ROM of back. Skin:   Three oval shaped areas that are of pinkish red color that are raised. Distribution is vertical but curved slightly to the left and starts very medial aspect of left buttock. Two upper ones are moderately tender to palpation and sensitive to the touch. ASSESSMENT and PLAN    ICD-10-CM ICD-9-CM    1. Rash R21 782.1      Based on symptoms and unilateral linear distribution of rash, it is most likely shingles. Yet, the appearance of the rash in not typical of it. She will be treated with both an antiviral and a steroid to treat shingles and any component of an allergic reaction. She is to return to center if there is not progressive improvement in 2-3 days or if it worsens. Patient verbalized understanding and agree with plan.

## 2017-12-21 NOTE — PATIENT INSTRUCTIONS
Shingles: Care Instructions  Your Care Instructions    Shingles (herpes zoster) causes pain and a blistered rash. The rash can appear anywhere on the body but will be on only one side of the body, the left or right. It will be in a band, a strip, or a small area. The pain can be very severe. Shingles can also cause tingling or itching in the area of the rash. The blisters scab over after a few days and heal in 2 to 4 weeks. Medicines can help you feel better and may help prevent more serious problems caused by shingles. Shingles is caused by the same virus that causes chickenpox. When you have chickenpox, the virus gets into your nerve roots and stays there (becomes dormant) long after you get over the chickenpox. If the virus becomes active again, it can cause shingles. Follow-up care is a key part of your treatment and safety. Be sure to make and go to all appointments, and call your doctor if you are having problems. It's also a good idea to know your test results and keep a list of the medicines you take. How can you care for yourself at home? · Be safe with medicines. Take your medicines exactly as prescribed. Call your doctor if you think you are having a problem with your medicine. Antiviral medicine helps you get better faster. · Try not to scratch or pick at the blisters. They will crust over and fall off on their own if you leave them alone. · Put cool, wet cloths on the area to relieve pain and itching. You can also use calamine lotion. Try not to use so much lotion that it cakes and is hard to get off. · Put cornstarch or baking soda on the sores to help dry them out so they heal faster. · Do not use thick ointment, such as petroleum jelly, on the sores. This will keep them from drying and healing. · To help remove loose crusts, soak them in tap water. This can help decrease oozing, and dry and soothe the skin.   · Take an over-the-counter pain medicine, such as acetaminophen (Tylenol), ibuprofen (Advil, Motrin), or naproxen (Aleve). Read and follow all instructions on the label. · Avoid close contact with people until the blisters have healed. It is very important for you to avoid contact with anyone who has never had chickenpox or the chickenpox vaccine. Pregnant women, young babies, and anyone else who has a hard time fighting infection (such as someone with HIV, diabetes, or cancer) is especially at risk. When should you call for help? Call your doctor now or seek immediate medical care if:  ? · You have a new or higher fever. ? · You have a severe headache and a stiff neck. ? · You lose the ability to think clearly. ? · The rash spreads to your forehead, nose, eyes, or eyelids. ? · You have eye pain, or your vision gets worse. ? · You have new pain in your face, or you cannot move the muscles in your face. ? · Blisters spread to new parts of your body. ? Watch closely for changes in your health, and be sure to contact your doctor if:  ? · The rash has not healed after 2 to 4 weeks. ? · You still have pain after the rash has healed. Where can you learn more? Go to http://jun-juan david.info/. Elina Carnes in the search box to learn more about \"Shingles: Care Instructions. \"  Current as of: March 3, 2017  Content Version: 11.4  © 4558-1780 [employer]. Care instructions adapted under license by [employer] (which disclaims liability or warranty for this information). If you have questions about a medical condition or this instruction, always ask your healthcare professional. [address] any warranty or liability for your use of this information.

## 2018-01-16 ENCOUNTER — OFFICE VISIT (OUTPATIENT)
Dept: FAMILY MEDICINE CLINIC | Age: 58
End: 2018-01-16

## 2018-01-16 VITALS
HEIGHT: 66 IN | BODY MASS INDEX: 35.36 KG/M2 | TEMPERATURE: 98.1 F | OXYGEN SATURATION: 98 % | HEART RATE: 109 BPM | DIASTOLIC BLOOD PRESSURE: 74 MMHG | SYSTOLIC BLOOD PRESSURE: 114 MMHG | WEIGHT: 220 LBS | RESPIRATION RATE: 16 BRPM

## 2018-01-16 DIAGNOSIS — Z11.59 ENCOUNTER FOR HEPATITIS C SCREENING TEST FOR LOW RISK PATIENT: ICD-10-CM

## 2018-01-16 DIAGNOSIS — Z12.11 SCREENING FOR COLON CANCER: ICD-10-CM

## 2018-01-16 DIAGNOSIS — J09.X2 INFLUENZA A (H5N1): ICD-10-CM

## 2018-01-16 DIAGNOSIS — R50.9 FEVER, UNSPECIFIED FEVER CAUSE: Primary | ICD-10-CM

## 2018-01-16 LAB
QUICKVUE INFLUENZA TEST: POSITIVE
VALID INTERNAL CONTROL?: YES

## 2018-01-16 RX ORDER — OSELTAMIVIR PHOSPHATE 75 MG/1
75 CAPSULE ORAL 2 TIMES DAILY
Qty: 10 CAP | Refills: 0 | Status: SHIPPED | OUTPATIENT
Start: 2018-01-16 | End: 2018-01-21

## 2018-01-16 NOTE — MR AVS SNAPSHOT
[address] [place] [address] 
[phone] Patient: Giancarlo Jorgensen MRN: [id number] OOS:8[birth year]49/4822 Visit Information Date & Time Provider Department Dept. Phone Encounter #  
 1/16/2018 11:00 AM Bertha Gaspar, [address] [facility] [phone] [id number] Follow-up Instructions Return if symptoms worsen or fail to improve. Upcoming Health Maintenance Date Due Hepatitis C Screening 1960 BREAST CANCER SCRN MAMMOGRAM 11/5/2015 PAP AKA CERVICAL CYTOLOGY 11/5/2016 FOBT Q 1 YEAR AGE 50-75 3/17/2017 Influenza Age 5 to Adult 8/1/2017 DTaP/Tdap/Td series (2 - Td) 9/6/2023 Allergies as of 1/16/2018  Review Complete On: 1/16/2018 By: Joyce Arrington LPN No Known Allergies Current Immunizations  Reviewed on 9/29/2016 Name Date Influenza Vaccine 9/26/2016, 11/11/2014 Tdap 9/6/2013  4:17 PM  
  
 Not reviewed this visit You Were Diagnosed With   
  
 Codes Comments Fever, unspecified fever cause    -  Primary ICD-10-CM: R50.9 ICD-9-CM: 780.60 Influenza A (H5N1)     ICD-10-CM: J09. X2 
ICD-9-CM: 488.02 Encounter for hepatitis C screening test for low risk patient     ICD-10-CM: Z11.59 
ICD-9-CM: V73.89 Screening for colon cancer     ICD-10-CM: Z12.11 ICD-9-CM: V76.51 Vitals BP Pulse Temp Resp Height(growth percentile) Weight(growth percentile) 114/74 (!) 109 98.1 °F (36.7 °C) (Oral) 16 5' 5.98\" (1.676 m) 220 lb (99.8 kg) LMP SpO2 BMI OB Status Smoking Status 04/08/2012 98% 35.53 kg/m2 Postmenopausal Never Smoker Vitals History BMI and BSA Data Body Mass Index Body Surface Area 35.53 kg/m 2 2.16 m 2 Preferred Pharmacy Pharmacy Name Phone [address] [phone] Your Updated Medication List  
  
   
 This list is accurate as of: 1/16/18 12:00 PM.  Always use your most recent med list.  
  
  
  
  
 B COMPLEX PO Take 1 tablet by mouth daily. CALCIUM 500 + D 500 mg(1,250mg) -400 unit Tab Generic drug:  Calcium-Cholecalciferol (D3) Take 1,200 mg by mouth daily. cpap machine kit  
by Does Not Apply route. Change to auto-CPAP at 5-8 cm with humidifier. Mask of choice. Length of need 99 months. Replace mask and accessories as needed x 12 months. Download data at 30 days and fax at [phone]. Dx- GARCÍA, excessive sleepiness, obesity. [facility] DHEA 50 mg Cap Generic drug:  prasterone (dhea) Take 1 tablet by mouth daily. FISH OIL 1,200-144-216 mg Cap Generic drug:  fish oil-dha-epa Take 1 capsule by mouth daily. Glucosamine HCl 500 mg Tab Take 1 tablet by mouth daily. MULTIVITAMIN PO Take 1 tablet by mouth daily. mupirocin 2 % ointment Commonly known as:  [facility] Apply  to affected area daily. oseltamivir 75 mg capsule Commonly known as:  TAMIFLU Take 1 Cap by mouth two (2) times a day for 5 days. sertraline 50 mg tablet Commonly known as:  ZOLOFT Take 2 tabs daily for 30 days VITAMIN C PO Take  by mouth. Prescriptions Sent to Pharmacy Refills  
 oseltamivir (TAMIFLU) 75 mg capsule 0 Sig: Take 1 Cap by mouth two (2) times a day for 5 days. Class: Normal  
 Pharmacy: [facility] [address] CEFER[address] AMES [address] [place] [address] Ph #: [phone] Route: Oral  
  
We Performed the Following AMB POC RAPID INFLUENZA TEST [47182 CPT(R)] Follow-up Instructions Return if symptoms worsen or fail to improve. To-Do List   
 01/16/2018 Lab:  HEPATITIS C AB   
  
 02/15/2018 Lab:  OCCULT BLOOD, IMMUNOASSAY (FIT) Patient Instructions Influenza (Flu): Care Instructions Your Care Instructions Influenza (flu) is an infection in the lungs and breathing passages. It is caused by the influenza virus. There are different strains, or types, of the flu virus from year to year. Unlike the common cold, the flu comes on suddenly and the symptoms, such as a cough, congestion, fever, chills, fatigue, aches, and pains, are more severe. These symptoms may last up to 10 days. Although the flu can make you feel very sick, it usually doesn't cause serious health problems. Home treatment is usually all you need for flu symptoms. But your doctor may prescribe antiviral medicine to prevent other health problems, such as pneumonia, from developing. Older people and those who have a long-term health condition, such as lung disease, are most at risk for having pneumonia or other health problems. Follow-up care is a key part of your treatment and safety. Be sure to make and go to all appointments, and call your doctor if you are having problems. It's also a good idea to know your test results and keep a list of the medicines you take. How can you care for yourself at home? · Get plenty of rest. 
· Drink plenty of fluids, enough so that your urine is light yellow or clear like water. If you have kidney, heart, or liver disease and have to limit fluids, talk with your doctor before you increase the amount of fluids you drink. · Take an over-the-counter pain medicine if needed, such as acetaminophen (Tylenol), ibuprofen (Advil, Motrin), or naproxen (Aleve), to relieve fever, headache, and muscle aches. Read and follow all instructions on the label. No one younger than 20 should take aspirin. It has been linked to Reye syndrome, a serious illness. · Do not smoke. Smoking can make the flu worse. If you need help quitting, talk to your doctor about stop-smoking programs and medicines. These can increase your chances of quitting for good.  
· Breathe moist air from a hot shower or from a sink filled with hot water to help clear a stuffy nose. · Before you use cough and cold medicines, check the label. These medicines may not be safe for young children or for people with certain health problems. · If the skin around your nose and lips becomes sore, put some petroleum jelly on the area. · To ease coughing: ¨ Drink fluids to soothe a scratchy throat. ¨ Suck on cough drops or plain hard candy. ¨ Take an over-the-counter cough medicine that contains dextromethorphan to help you get some sleep. Read and follow all instructions on the label. ¨ Raise your head at night with an extra pillow. This may help you rest if coughing keeps you awake. · Take any prescribed medicine exactly as directed. Call your doctor if you think you are having a problem with your medicine. To avoid spreading the flu · Wash your hands regularly, and keep your hands away from your face. · Stay home from school, work, and other public places until you are feeling better and your fever has been gone for at least 24 hours. The fever needs to have gone away on its own without the help of medicine. · Ask people living with you to talk to their doctors about preventing the flu. They may get antiviral medicine to keep from getting the flu from you. · To prevent the flu in the future, get a flu vaccine every fall. Encourage people living with you to get the vaccine. · Cover your mouth when you cough or sneeze. When should you call for help? Call 911 anytime you think you may need emergency care. For example, call if: 
? · You have severe trouble breathing. ?Call your doctor now or seek immediate medical care if: 
? · You have new or worse trouble breathing. ? · You seem to be getting much sicker. ? · You feel very sleepy or confused. ? · You have a new or higher fever. ? · You get a new rash. ? Watch closely for changes in your health, and be sure to contact your doctor if: 
? · You begin to get better and then get worse. ? · You are not getting better after 1 week. Where can you learn more? Go to http://jun-juan david.info/. Enter X912 in the search box to learn more about \"Influenza (Flu): Care Instructions. \" Current as of: May 12, 2017 Content Version: 11.4 © 1046-5320 [employer]. Care instructions adapted under license by [employer] (which disclaims liability or warranty for this information). If you have questions about a medical condition or this instruction, always ask your healthcare professional. [address] any warranty or liability for your use of this information. Introducing [facility]! Dear Catherine Lombardi: Thank you for requesting a [employer] account. Our records indicate that you already have an active [employer] account. You can access your account anytime at [email or web address] [email or web address]/Given Goods Did you know that you can access your hospital and ER discharge instructions at any time in [employer]? You can also review all of your test results from your hospital stay or ER visit. Additional Information If you have questions, please visit the Frequently Asked Questions section of the [employer] website at [email or web address] Remember, [employer] is NOT to be used for urgent needs. For medical emergencies, dial 911. Now available from your iPhone and Android! Please provide this summary of care documentation to your next provider. Your primary care clinician is listed as Jami Elena. If you have any questions after today's visit, please call [phone].

## 2018-01-16 NOTE — PATIENT INSTRUCTIONS

## 2018-01-22 NOTE — PROGRESS NOTES
HISTORY OF PRESENT ILLNESS  Ricky Koehler is a 65 y.o. female who has had a cough and fever since yesterday. She states that her fever was 101.8 today. Cough is dry and intermittent. She denies sore throat. Fever    The history is provided by the patient. This is a new problem. The current episode started yesterday. The problem occurs constantly. The problem has not changed since onset. The maximum temperature noted was 101 - 101.9 F. The temperature was taken using an oral thermometer. Associated symptoms include cough. Pertinent negatives include no chest pain, no congestion and no sore throat. Cough   The history is provided by the patient. The current episode started yesterday. The problem occurs daily. The problem has not changed since onset. Pertinent negatives include no chest pain. Nothing aggravates the symptoms. She has tried nothing for the symptoms. Generalized Body Aches   The history is provided by the patient. The current episode started yesterday. The problem occurs constantly. The problem has not changed since onset. Pertinent negatives include no chest pain. Nothing aggravates the symptoms. The treatment provided mild relief. Visit Vitals    /74    Pulse (!) 109    Temp 98.1 °F (36.7 °C) (Oral)    Resp 16    Ht 5' 5.98\" (1.676 m)    Wt 220 lb (99.8 kg)    LMP 04/08/2012    SpO2 98%    BMI 35.53 kg/m2     Past Medical History:   Diagnosis Date    Depressive disorder, not elsewhere classified     Gynecologic exam normal     Dr. Jayce Hernandez H/O bone density study 9/2013    normal    Hip pain 9/2014    Osteophyte     bilat knees    Sleep apnea     TMJ (temporomandibular joint disorder)        Review of Systems   Constitutional: Positive for chills, fever and malaise/fatigue. HENT: Negative for congestion, ear pain and sore throat. Respiratory: Positive for cough. Cardiovascular: Negative for chest pain. Gastrointestinal: Negative for nausea. Physical Exam   Constitutional: No distress. HENT:   Right Ear: External ear normal.   Left Ear: External ear normal.   Nose: Nose normal.   Mouth/Throat: Oropharynx is clear and moist.   Neck: Neck supple. Cardiovascular: Normal rate, regular rhythm and normal heart sounds. Pulmonary/Chest: Effort normal and breath sounds normal.   Lymphadenopathy:     She has no cervical adenopathy. ASSESSMENT and PLAN    ICD-10-CM ICD-9-CM    1. Fever, unspecified fever cause R50.9 780.60 AMB POC RAPID INFLUENZA TEST   2. Influenza A (H5N1) J09. X2 488.02 oseltamivir (TAMIFLU) 75 mg capsule   3. Encounter for hepatitis C screening test for low risk patient Z11.59 V73.89 HEPATITIS C AB   4. Screening for colon cancer Z12.11 V76.51 OCCULT BLOOD, IMMUNOASSAY (FIT)       Influenza test was positive. She is started on Tamiflu. She is instructed to make sure that she keep hydrated and rested. She is due for hepatitis screening as well as a fit test for her to complete, so those were ordered as noted. Patient is to RTC if no improvement in the next 3-5 days or if there are worsening symptoms. Patient verbalizes understanding and agrees with plan.

## 2018-02-23 RX ORDER — SERTRALINE HYDROCHLORIDE 50 MG/1
TABLET, FILM COATED ORAL
Qty: 60 TAB | Refills: 1 | Status: SHIPPED | OUTPATIENT
Start: 2018-02-23 | End: 2018-03-23 | Stop reason: ALTCHOICE

## 2018-02-23 NOTE — TELEPHONE ENCOUNTER
Pt calling to request medication refill of:    Requested Prescriptions     Pending Prescriptions Disp Refills    sertraline (ZOLOFT) 50 mg tablet 60 Tab 5     Sig: Take 2 tabs daily for 30 days          [facility] Pt has about 1 tabs remaining. Pts last appt was 01/16/2018. Advised pt of 72 hour time frame for refill requests. Please advise.

## 2018-02-23 NOTE — TELEPHONE ENCOUNTER
Requested Prescriptions     Pending Prescriptions Disp Refills    sertraline (ZOLOFT) 50 mg tablet 60 Tab 1     Sig: Take 2 tabs daily for 30 days

## 2018-03-23 ENCOUNTER — OFFICE VISIT (OUTPATIENT)
Dept: FAMILY MEDICINE CLINIC | Age: 58
End: 2018-03-23

## 2018-03-23 ENCOUNTER — HOSPITAL ENCOUNTER (OUTPATIENT)
Dept: LAB | Age: 58
Discharge: HOME | End: 2018-03-23
Payer: COMMERCIAL

## 2018-03-23 VITALS
DIASTOLIC BLOOD PRESSURE: 80 MMHG | OXYGEN SATURATION: 96 % | WEIGHT: 243.2 LBS | HEART RATE: 73 BPM | TEMPERATURE: 96.5 F | BODY MASS INDEX: 39.08 KG/M2 | HEIGHT: 66 IN | SYSTOLIC BLOOD PRESSURE: 126 MMHG | RESPIRATION RATE: 17 BRPM

## 2018-03-23 DIAGNOSIS — F32.A DEPRESSIVE DISORDER: ICD-10-CM

## 2018-03-23 DIAGNOSIS — Z00.00 ANNUAL PHYSICAL EXAM: ICD-10-CM

## 2018-03-23 DIAGNOSIS — Z00.00 ANNUAL PHYSICAL EXAM: Primary | ICD-10-CM

## 2018-03-23 LAB
ALBUMIN SERPL-MCNC: 3.8 G/DL (ref 3.4–5)
ALBUMIN/GLOB SERPL: 1.1 {RATIO} (ref 0.8–1.7)
ALP SERPL-CCNC: 88 U/L (ref 45–117)
ALT SERPL-CCNC: 59 U/L (ref 13–56)
ANION GAP SERPL CALC-SCNC: 6 MMOL/L (ref 3–18)
AST SERPL-CCNC: 26 U/L (ref 15–37)
BILIRUB SERPL-MCNC: 0.6 MG/DL (ref 0.2–1)
BUN SERPL-MCNC: 14 MG/DL (ref 7–18)
BUN/CREAT SERPL: 16 (ref 12–20)
CALCIUM SERPL-MCNC: 8.5 MG/DL (ref 8.5–10.1)
CHLORIDE SERPL-SCNC: 105 MMOL/L (ref 100–108)
CHOLEST SERPL-MCNC: 145 MG/DL
CO2 SERPL-SCNC: 29 MMOL/L (ref 21–32)
CREAT SERPL-MCNC: 0.85 MG/DL (ref 0.6–1.3)
ERYTHROCYTE [DISTWIDTH] IN BLOOD BY AUTOMATED COUNT: 13.5 % (ref 11.6–14.5)
GLOBULIN SER CALC-MCNC: 3.5 G/DL (ref 2–4)
GLUCOSE SERPL-MCNC: 96 MG/DL (ref 74–99)
HCT VFR BLD AUTO: 43.9 % (ref 35–45)
HDLC SERPL-MCNC: 49 MG/DL (ref 40–60)
HDLC SERPL: 3 {RATIO} (ref 0–5)
HGB BLD-MCNC: 14.5 G/DL (ref 12–16)
LDLC SERPL CALC-MCNC: 79.2 MG/DL (ref 0–100)
LIPID PROFILE,FLP: NORMAL
MCH RBC QN AUTO: 32.8 PG (ref 24–34)
MCHC RBC AUTO-ENTMCNC: 33 G/DL (ref 31–37)
MCV RBC AUTO: 99.3 FL (ref 74–97)
PLATELET # BLD AUTO: 220 K/UL (ref 135–420)
PMV BLD AUTO: 11.3 FL (ref 9.2–11.8)
POTASSIUM SERPL-SCNC: 4.3 MMOL/L (ref 3.5–5.5)
PROT SERPL-MCNC: 7.3 G/DL (ref 6.4–8.2)
RBC # BLD AUTO: 4.42 M/UL (ref 4.2–5.3)
SODIUM SERPL-SCNC: 140 MMOL/L (ref 136–145)
TRIGL SERPL-MCNC: 84 MG/DL (ref ?–150)
TSH SERPL DL<=0.05 MIU/L-ACNC: 1.38 UIU/ML (ref 0.36–3.74)
VLDLC SERPL CALC-MCNC: 16.8 MG/DL
WBC # BLD AUTO: 6.3 K/UL (ref 4.6–13.2)

## 2018-03-23 PROCEDURE — 84443 ASSAY THYROID STIM HORMONE: CPT | Performed by: PHYSICIAN ASSISTANT

## 2018-03-23 PROCEDURE — 80061 LIPID PANEL: CPT | Performed by: PHYSICIAN ASSISTANT

## 2018-03-23 PROCEDURE — 85027 COMPLETE CBC AUTOMATED: CPT | Performed by: PHYSICIAN ASSISTANT

## 2018-03-23 PROCEDURE — 80053 COMPREHEN METABOLIC PANEL: CPT | Performed by: PHYSICIAN ASSISTANT

## 2018-03-23 PROCEDURE — 36415 COLL VENOUS BLD VENIPUNCTURE: CPT | Performed by: PHYSICIAN ASSISTANT

## 2018-03-23 RX ORDER — SERTRALINE HYDROCHLORIDE 50 MG/1
TABLET, FILM COATED ORAL
Qty: 60 TAB | Refills: 5 | Status: SHIPPED | OUTPATIENT
Start: 2018-03-23 | End: 2018-11-01 | Stop reason: SDUPTHER

## 2018-03-23 NOTE — PROGRESS NOTES
History of Present Illness  Patient presents for complete physical and has no complaints. Denies any pertinent PMH nor has had any recent illnesses. She has a history of depression, which is managed well with daily Zoloft. Denies any CP or SOB with physical activity. Past Medical History:   Diagnosis Date    Depressive disorder, not elsewhere classified     Gynecologic exam normal     Dr. Graciela Meeks H/O bone density study 9/2013    normal    Hip pain 9/2014    Osteophyte     bilat knees    Sleep apnea     TMJ (temporomandibular joint disorder)      Current Outpatient Prescriptions on File Prior to Visit   Medication Sig Dispense Refill    ASCORBATE CALCIUM (VITAMIN C PO) Take 1,000 mg by mouth daily.  cpap machine kit by Does Not Apply route. Change to auto-CPAP at 5-8 cm with humidifier. Mask of choice. Length of need 99 months. Replace mask and accessories as needed x 12 months. Download data at 30 days and fax at [phone]. Dx- GARCÍA, excessive sleepiness, obesity. DME- [facility] 1 Kit 0    prasterone, dhea, (DHEA) 50 mg cap Take 1 Cap by mouth daily.  VITAMIN B COMPLEX (B COMPLEX PO) Take 1 tablet by mouth daily.  Glucosamine HCl 500 mg Tab Take 1 tablet by mouth daily.  Calcium-Cholecalciferol, D3, (CALCIUM 500 + D) 500 mg(1,250mg) -400 unit Tab Take 1,200 mg by mouth daily.  fish oil-dha-epa (FISH OIL) 1,200-144-216 mg Cap Take 1 capsule by mouth daily.  MULTIVITAMIN PO Take 1 tablet by mouth daily. No current facility-administered medications on file prior to visit.       No Known Allergies    Visit Vitals    /80 (BP 1 Location: Right arm, BP Patient Position: Sitting)  Comment: manual    Pulse 73    Temp 96.5 °F (35.8 °C) (Oral)    Resp 17    Ht 5' 5.98\" (1.676 m)    Wt 243 lb 3.2 oz (110.3 kg)    LMP 04/08/2012    SpO2 96%    BMI 39.28 kg/m2       Review of Systems   General: negative for - chills, fatigue or fever  Psychological: negative for - anxiety or depression  Ophthalmic: negative for - double vision, photophobia or uses glasses  ENT: negative for - epistaxis, headaches, vertigo or visual changes  Hematological and Lymphatic: negative for - bleeding problems, night sweats or swollen lymph nodes  Endocrine: negative for - breast changes, malaise/lethargy, palpitations or polydipsia/polyuria  Respiratory: no cough, shortness of breath, or wheezing  Cardiovascular: no chest pain or dyspnea on exertion  Gastrointestinal: no abdominal pain, change in bowel habits  Genito-Urinary: no dysuria, trouble voiding, or hematuria  Musculoskeletal: negative for - muscular weakness  Dermatological: negative for - eczema or rash    Physical Exam  General appearance - alert, well appearing, and in no distress  Mental status - alert, oriented to person, place, and time  Eyes - pupils equal and reactive, extraocular eye movements intact  Ears - bilateral TM's and external ear canals normal  Nose - normal and patent, no erythema, discharge or polyps  Mouth - mucous membranes moist, pharynx normal without lesions  Neck - supple, no significant adenopathy  Chest - clear to auscultation, no wheezes, rales or rhonchi, symmetric air entry  Heart - normal rate, regular rhythm, normal S1, S2, no murmurs, rubs, clicks or gallops  Abdomen - soft, nontender, nondistended, no masses or organomegaly  Neurological - alert, oriented, normal speech, DTRs 2/3 bilaterally  Musculoskeletal - no joint tenderness, deformity or swelling  Extremities - peripheral pulses normal, no pedal edema, no clubbing or cyanosis  Skin - normal coloration and turgor, no rashes, no suspicious skin lesions noted      Assessment and Plan    1. Annual Physical Exam   Labs: Lipid panel, CMP, CBC, TSH    Patient appears to be in good health and questions answered. She is encouraged to work on her diet and to start routinely exercising.

## 2018-03-23 NOTE — PROGRESS NOTES
Tho Aguillon is a 65 y.o. female presents in office     Health Maintenance Due   Topic Date Due    Hepatitis C Screening  1960    BREAST CANCER SCRN MAMMOGRAM  11/05/2015    FOBT Q 1 YEAR AGE 50-75  03/17/2017     Mammogram - through GYN, Dr. [facility]    1. Have you been to the ER, urgent care clinic since your last visit? Hospitalized since your last visit? No    2. Have you seen or consulted any other health care providers outside of the [address] [place] [address] since your last visit? Include any pap smears or colon screening.  No

## 2018-03-23 NOTE — MR AVS SNAPSHOT
[address] [place] [address] 
[phone] Patient: Mindy Cook MRN: [id number] ANGELINE:[birth year] Visit Information Date & Time Provider Department Dept. Phone Encounter #  
 3/23/2018  8:00 AM [facility] [place] [phone] [id number] Upcoming Health Maintenance Date Due Hepatitis C Screening 1960 BREAST CANCER SCRN MAMMOGRAM 11/5/2015 FOBT Q 1 YEAR AGE 50-75 3/17/2017 Influenza Age 5 to Adult 9/1/2018* PAP AKA CERVICAL CYTOLOGY 2/9/2019 DTaP/Tdap/Td series (2 - Td) 9/6/2023 *Topic was postponed. The date shown is not the original due date. Allergies as of 3/23/2018  Review Complete On: 3/23/2018 By: CHRISTIAN Erwin No Known Allergies Current Immunizations  Reviewed on 9/29/2016 Name Date Influenza Vaccine 9/26/2016, 11/11/2014 Tdap 9/6/2013  4:17 PM  
  
 Not reviewed this visit You Were Diagnosed With   
  
 Codes Comments Annual physical exam    -  Primary ICD-10-CM: Z00.00 ICD-9-CM: V70.0 Depressive disorder     ICD-10-CM: F32.9 ICD-9-CM: 614 Vitals BP Pulse Temp Resp Height(growth percentile) Weight(growth percentile) 126/80 (BP 1 Location: Right arm, BP Patient Position: Sitting) 73 96.5 °F (35.8 °C) (Oral) 17 5' 5.98\" (1.676 m) 243 lb 3.2 oz (110.3 kg) LMP SpO2 BMI OB Status Smoking Status 04/08/2012 96% 39.28 kg/m2 Postmenopausal Never Smoker Vitals History BMI and BSA Data Body Mass Index Body Surface Area  
 39.28 kg/m 2 2.27 m 2 Preferred Pharmacy Pharmacy Name Phone [address] [facility] [phone] Your Updated Medication List  
  
   
This list is accurate as of 3/23/18  8:54 AM.  Always use your most recent med list.  
  
  
  
  
 B COMPLEX PO Take 1 tablet by mouth daily. CALCIUM 500 + D 500 mg(1,250mg) -400 unit Tab Generic drug:  Calcium-Cholecalciferol (D3) Take 1,200 mg by mouth daily. cpap machine kit  
by Does Not Apply route. Change to auto-CPAP at 5-8 cm with humidifier. Mask of choice. Length of need 99 months. Replace mask and accessories as needed x 12 months. Download data at 30 days and fax at [phone]. Dx- GARCÍA, excessive sleepiness, obesity. DME- [facility] DHEA 50 mg Cap Generic drug:  prasterone (dhea) Take 1 Cap by mouth daily. FISH OIL 1,200-144-216 mg Cap Generic drug:  fish oil-dha-epa Take 1 capsule by mouth daily. Glucosamine HCl 500 mg Tab Take 1 tablet by mouth daily. MULTIVITAMIN PO Take 1 tablet by mouth daily. sertraline 50 mg tablet Commonly known as:  ZOLOFT Take 2 tabs daily for 30 days VITAMIN C PO Take 1,000 mg by mouth daily. Prescriptions Sent to Pharmacy Refills  
 sertraline (ZOLOFT) 50 mg tablet 5 Sig: Take 2 tabs daily for 30 days Class: Normal  
 Pharmacy: [facility] [address] AMES [address] [place] [address] Ph #: [phone] To-Do List   
 03/23/2018 Lab:  CBC W/O DIFF   
  
 03/23/2018 Lab:  LIPID PANEL   
  
 03/23/2018 Lab:  METABOLIC PANEL, [facility]! Dear Steven Vega: Thank you for requesting a [employer] account. Our records indicate that you already have an active [employer] account. You can access your account anytime at [email or web address] Did you know that you can access your hospital and ER discharge instructions at any time in [employer]? You can also review all of your test results from your hospital stay or ER visit. Additional Information If you have questions, please visit the Frequently Asked Questions section of the [employer] website at [email or web address] Remember, [employer] is NOT to be used for urgent needs.  For medical emergencies, dial 911. Now available from your iPhone and Android! Please provide this summary of care documentation to your next provider. Your primary care clinician is listed as Duane Sands. If you have any questions after today's visit, please call [phone].

## 2018-03-24 ENCOUNTER — HOSPITAL ENCOUNTER (OUTPATIENT)
Dept: LAB | Age: 58
Discharge: HOME | End: 2018-03-24
Payer: COMMERCIAL

## 2018-03-24 DIAGNOSIS — Z12.11 SCREENING FOR COLON CANCER: ICD-10-CM

## 2018-03-24 PROCEDURE — 82274 ASSAY TEST FOR BLOOD FECAL: CPT | Performed by: PHYSICIAN ASSISTANT

## 2018-03-28 LAB — HEMOCCULT STL QL IA: NEGATIVE

## 2018-04-10 ENCOUNTER — TELEPHONE (OUTPATIENT)
Dept: FAMILY MEDICINE CLINIC | Age: 58
End: 2018-04-10

## 2018-04-10 NOTE — TELEPHONE ENCOUNTER
----- Message from Cheryl Mclaughlinma sent at 4/4/2018  7:06 PM EDT -----  Please call pt to inform her that her labs were all within range except for her ALT, which is a liver enzyme. It is only slightly elevated,but her last ALT result was normal.  Advise her to repeat the liver enzymes in 6 months and in the meantime to make sure she has a low chol diet (avoid [place] fats, starchy foods, and fried and fast foods).   Also avoid alcohol and limit tylenol

## 2018-04-30 ENCOUNTER — OFFICE VISIT (OUTPATIENT)
Dept: FAMILY MEDICINE CLINIC | Age: 58
End: 2018-04-30

## 2018-04-30 VITALS
HEART RATE: 75 BPM | TEMPERATURE: 97 F | WEIGHT: 245 LBS | OXYGEN SATURATION: 98 % | BODY MASS INDEX: 39.37 KG/M2 | RESPIRATION RATE: 16 BRPM | SYSTOLIC BLOOD PRESSURE: 120 MMHG | DIASTOLIC BLOOD PRESSURE: 70 MMHG | HEIGHT: 66 IN

## 2018-04-30 DIAGNOSIS — L24.7 IRRITANT CONTACT DERMATITIS DUE TO PLANTS, EXCEPT FOOD: ICD-10-CM

## 2018-04-30 DIAGNOSIS — R21 SKIN RASH: Primary | ICD-10-CM

## 2018-04-30 RX ORDER — MOMETASONE FUROATE 1 MG/G
CREAM TOPICAL 2 TIMES DAILY
Qty: 50 G | Refills: 0 | Status: SHIPPED | OUTPATIENT
Start: 2018-04-30 | End: 2018-08-20

## 2018-04-30 NOTE — PROGRESS NOTES
Heri Jones is a 65 y.o. female presents to office for poison ivy x1 week         Health Maintenance items with a due date reviewed with patient:  Health Maintenance Due   Topic Date Due    Hepatitis C Screening  1960    BREAST CANCER SCRN MAMMOGRAM  11/05/2015

## 2018-04-30 NOTE — MR AVS SNAPSHOT
[address] [address] 
[phone] Patient: Darya Morales MRN: [id number] CP Visit Information Date & Time Provider Department Dept. Phone Encounter #  
 2018  1:45 PM 58 MD [facility] [place] [phone] [id number] Follow-up Instructions Return in about 1 week (around 2018), or if symptoms worsen or fail to improve. Upcoming Health Maintenance Date Due Hepatitis C Screening 1960 BREAST CANCER SCRN MAMMOGRAM 2015 Influenza Age 5 to Adult 2018* PAP AKA CERVICAL CYTOLOGY 2019 FOBT Q 1 YEAR AGE 50-75 3/24/2019 DTaP/Tdap/Td series (2 - Td) 2023 *Topic was postponed. The date shown is not the original due date. Allergies as of 2018  Review Complete On: 2018 By: Adolfo Libman, LPN No Known Allergies Current Immunizations  Reviewed on 2016 Name Date Influenza Vaccine 2016, 2014 Tdap 2013  4:17 PM  
  
 Not reviewed this visit You Were Diagnosed With   
  
 Codes Comments Skin rash    -  Primary ICD-10-CM: R21 
ICD-9-CM: 782.1 Irritant contact dermatitis due to plants, except food     ICD-10-CM: L24.7 ICD-9-CM: 692.6 Vitals BP Pulse Temp Resp Height(growth percentile) Weight(growth percentile) 120/70 (BP 1 Location: Left arm, BP Patient Position: Sitting) 75 97 °F (36.1 °C) (Oral) 16 5' 5.98\" (1.676 m) 245 lb (111.1 kg) LMP SpO2 BMI OB Status Smoking Status 2012 98% 39.57 kg/m2 Postmenopausal Never Smoker Vitals History BMI and BSA Data Body Mass Index Body Surface Area  
 39.57 kg/m 2 2.27 m 2 Preferred Pharmacy Pharmacy Name Phone [address] [phone] Your Updated Medication List  
  
   
 This list is accurate as of 4/30/18  2:38 PM.  Always use your most recent med list.  
  
  
  
  
 B COMPLEX PO Take 1 tablet by mouth daily. CALCIUM 500 + D 500 mg(1,250mg) -400 unit Tab Generic drug:  Calcium-Cholecalciferol (D3) Take 1,200 mg by mouth daily. cpap machine kit  
by Does Not Apply route. Change to auto-CPAP at 5-8 cm with humidifier. Mask of choice. Length of need 99 months. Replace mask and accessories as needed x 12 months. Download data at 30 days and fax at [phone]. Dx- GARCÍA, excessive sleepiness, obesity. [facility] DHEA 50 mg Cap Generic drug:  prasterone (dhea) Take 1 Cap by mouth daily. FISH OIL 1,200-144-216 mg Cap Generic drug:  fish oil-dha-epa Take 1 capsule by mouth daily. Glucosamine HCl 500 mg Tab Take 1 tablet by mouth daily. mometasone 0.1 % topical cream  
Commonly known as:  Charlotte Wendy Apply  to affected area two (2) times a day. MULTIVITAMIN PO Take 1 tablet by mouth daily. sertraline 50 mg tablet Commonly known as:  ZOLOFT Take 2 tabs daily for 30 days VITAMIN C PO Take 1,000 mg by mouth daily. Prescriptions Sent to Pharmacy Refills  
 mometasone (ELOCON) 0.1 % topical cream 0 Sig: Apply  to affected area two (2) times a day. Class: Normal  
 Pharmacy: [address] [place] [address] #: [phone] Route: Topical  
  
Follow-up Instructions Return in about 1 week (around 5/7/2018), or if symptoms worsen or fail to improve. Patient Instructions Dermatitis: Care Instructions Your Care Instructions Dermatitis is the general name used for any rash or inflammation of the skin. Different kinds of dermatitis cause different kinds of rashes. Common causes of a rash include new medicines, plants (such as poison oak or poison ivy), heat, and stress. Certain illnesses can also cause a rash. An allergic reaction to something that touches your skin, such as latex, nickel, or poison ivy, is called contact dermatitis. Contact dermatitis may also be caused by something that irritates the skin, such as bleach, a chemical, or soap. These types of rashes cannot be spread from person to person. How long your rash will last depends on what caused it. Rashes may last a few days or months. Follow-up care is a key part of your treatment and safety. Be sure to make and go to all appointments, and call your doctor if you are having problems. It's also a good idea to know your test results and keep a list of the medicines you take. How can you care for yourself at home? · Do not scratch the rash. Cut your nails short, and file them smooth. Or wear gloves if this helps keep you from scratching. · Wash the area with water only. Pat dry. · Put cold, wet cloths on the rash to reduce itching. · Keep cool, and stay out of the sun. · Leave the rash open to the air as much as possible. · If the rash itches, use hydrocortisone cream. Follow the directions on the label. Calamine lotion may help for plant rashes. · Take an over-the-counter antihistamine, such as diphenhydramine (Benadryl) or loratadine (Claritin), to help calm the itching. Read and follow all instructions on the label. · If your doctor prescribed a cream, use it as directed. If your doctor prescribed medicine, take it exactly as directed. When should you call for help? Call your doctor now or seek immediate medical care if: 
? · You have symptoms of infection, such as: 
¨ Increased pain, swelling, warmth, or redness. ¨ Red streaks leading from the area. ¨ Pus draining from the area. ¨ A fever. ? · You have joint pain along with the rash. ? Watch closely for changes in your health, and be sure to contact your doctor if: 
? · Your rash is changing or getting worse. ? · You are not getting better as expected. Where can you learn more? Go to http://jun-juan david.info/. Enter [phone] in the search box to learn more about \"Dermatitis: Care Instructions. \" Current as of: October 13, 2016 Content Version: 11.4 © 8337-9898 PageLever. Care instructions adapted under license by [employer] (which disclaims liability or warranty for this information). If you have questions about a medical condition or this instruction, always ask your healthcare professional. [place] [address] any warranty or liability for your use of this information. Poison [place], [place], and [place]: Care Instructions Your Care Instructions Poison ivy, poison oak, and poison sumac are plants that can cause a skin rash upon contact. The red, itchy rash often shows up in lines or streaks and may cause fluid-filled blisters or large, raised hives. The rash is caused by an allergic reaction to an oil in poison ivy, oak, and sumac. The rash may occur when you touch the plant or when you touch clothing, pet fur, sporting gear, gardening tools, or other objects that have come in contact with one of these plants. You cannot catch or spread the rash, even if you touch it or the blister fluid, because the plant oil will already have been absorbed or washed off the skin. The rash may seem to be spreading, but either it is still developing from earlier contact or you have touched something that still has the plant oil on it. Follow-up care is a key part of your treatment and safety. Be sure to make and go to all appointments, and call your doctor if you are having problems. It's also a good idea to know your test results and keep a list of the medicines you take. How can you care for yourself at home? · If your doctor prescribed a cream, use it as directed. If your doctor prescribed medicine, take it exactly as prescribed. Call your doctor if you think you are having a problem with your medicine. · Use cold, wet cloths to reduce itching. · Keep cool, and stay out of the sun. · Leave the rash open to the air. · Wash all clothing or other things that may have come in contact with the plant oil. · Avoid most lotions and ointments until the rash heals. Calamine lotion may help relieve symptoms of a plant rash. Use it 3 or 4 times a day. To prevent poison ivy exposure If you know that you will be near poison ivy, oak, or sumac, you can try these options: · Use a product designed to help prevent plant oil from getting on the skin. These products, such as Ivy X Pre-Contact Skin Solution, come in lotions, sprays, or towelettes. You put the product on your skin right before you go outdoors. · If you did not use a preventive product and you have had contact with plant oil, clean it off your skin as soon as possible. Use a product such as Tecnu Original Outdoor Skin Cleanser. These products can also be used to clean plant oil from clothing or tools. When should you call for help? Call your doctor now or seek immediate medical care if: 
? · Your rash gets worse, and you start to feel bad and have a fever, a stiff neck, nausea, and vomiting. ? · You have signs of infection, such as: 
¨ Increased pain, swelling, warmth, or redness. ¨ Red streaks leading from the rash. ¨ Pus draining from the rash. ¨ A fever. ? Watch closely for changes in your health, and be sure to contact your doctor if: 
? · You have new blisters or bruises, or the rash spreads and looks like a sunburn. ? · The rash gets worse, or it comes back after nearly disappearing. ? · You think a medicine you are using is making your rash worse. ? · Your rash does not clear up after 1 to 2 weeks of home treatment. ? · You have joint aches or body aches with your rash. Where can you learn more? Go to http://jun-juan david.info/.  
Enter L240 in the search box to learn more about \"Poison [place], [place], and [place]: Care Instructions. \" Current as of: October 13, 2016 Content Version: 11.4 © 8902-9784 PlaySight. Care instructions adapted under license by [employer] (which disclaims liability or warranty for this information). If you have questions about a medical condition or this instruction, always ask your healthcare professional. [address]  any warranty or liability for your use of this information. Introducing [facility] & HE[facility]! [employer] introduces Awesomi patient portal. Now you can access parts of your medical record, email your doctor's office, and request medication refills online. 1. In your internet browser, go to [email or web address] [email or web address] Edge [email or web address] 2. Click on the First Time User? Click Here link in the Sign In box. You will see the New Member Sign Up page. 3. Enter your Awesomi Access Code exactly as it appears below. You will not need to use this code after youve completed the sign-up process. If you do not sign up before the expiration date, you must request a new code. · Awesomi Access Code: [email or web address] Expires: 7/29/2018  2:33 PM 
 
4. Enter the last four digits of your Social Security Number (xxxx) and Date of Birth (mm/dd/yyyy) as indicated and click Submit. You will be taken to the next sign-up page. 5. Create a Awesomi ID. This will be your Awesomi login ID and cannot be changed, so think of one that is secure and easy to remember. 6. Create a Awesomi password. You can change your password at any time. 7. Enter your Password Reset Question and Answer. This can be used at a later time if you forget your password. 8. Enter your e-mail address. You will receive e-mail notification when new information is available in [address] 9. Click Sign Up. You can now view and download portions of your medical record. 10. Click the Download Summary menu link to download a portable copy of your medical information. If you have questions, please visit the Frequently Asked Questions section of the SlideJart website. Remember, [employer]is is NOT to be used for urgent needs. For medical emergencies, dial 911. Now available from your iPhone and Android! Please provide this summary of care documentation to your next provider. Your primary care clinician is listed as Marty Gray. If you have any questions after today's visit, please call [phone].

## 2018-04-30 NOTE — PROGRESS NOTES
Nasreen Griffin     Chief Complaint   Patient presents with    Other     poison ivy      Vitals:    18 1403   BP: 120/70   Pulse: 75   Resp: 16   Temp: 97 °F (36.1 °C)   TempSrc: Oral   SpO2: 98%   Weight: 245 lb (111.1 kg)   Height: 5' 5.98\" (1.676 m)   PainSc:   0 - No pain   LMP: 2012         HPI: Patient is here complaining about the skin rash that she had for about few days, over week ago she was cleaning backyard and she might have been exposed to poison ivy or poison oak but she has not seen anything, she has been having itching and erythematous lesion in her upper and lower extremities very minimal in the torso, she has tried over-the-counter cortisone cream, and home remedies with baking soda and vinegar, as well as Benadryl but there is no improvement.   She has no fever no chills no nausea no vomiting no shortness of breath no cough    Past Medical History:   Diagnosis Date    Depressive disorder, not elsewhere classified     Gynecologic exam normal     Dr. Rolly Dugan H/O bone density study 2013    normal    Hip pain 2014    Osteophyte     bilat knees    Sleep apnea     TMJ (temporomandibular joint disorder)      Past Surgical History:   Procedure Laterality Date    HC MAMMO DIAG BILAT      11/10    HX CYSTECTOMY      L shoulder    HX ORTHOPAEDIC      lipoma removed     Social History   Substance Use Topics    Smoking status: Never Smoker    Smokeless tobacco: Never Used    Alcohol use Yes      Comment: occasionally       Family History   Problem Relation Age of Onset    Thyroid Disease Mother     Pneumonia Mother     Dementia Father     Stroke Father     Diabetes Sister     Elevated Lipids Sister     Hypertension Sister     No Known Problems Brother     No Known Problems Brother     Heart Disease Maternal Grandmother          Heart Disease Maternal Aunt        Review of Systems   Constitutional: Negative for chills, fever, malaise/fatigue and weight loss.   HENT: Negative for congestion, ear discharge, ear pain, hearing loss, nosebleeds, sinus pain and sore throat. Eyes: Negative for blurred vision, double vision and discharge. Respiratory: Negative for cough, hemoptysis, sputum production, shortness of breath and wheezing. Cardiovascular: Negative for chest pain, palpitations, claudication and leg swelling. Gastrointestinal: Negative for abdominal pain, constipation, diarrhea, nausea and vomiting. Genitourinary: Negative for dysuria, frequency and urgency. Musculoskeletal: Negative for back pain, joint pain, myalgias and neck pain. Skin: Positive for itching and rash. Neurological: Negative for dizziness, tingling, sensory change, speech change, focal weakness, weakness and headaches. Psychiatric/Behavioral: Positive for depression. Negative for hallucinations, substance abuse and suicidal ideas. The patient is not nervous/anxious and does not have insomnia. Physical Exam   Constitutional: She is oriented to person, place, and time. She appears well-developed and well-nourished. No distress. HENT:   Head: Normocephalic and atraumatic. Mouth/Throat: Oropharynx is clear and moist. No oropharyngeal exudate. Eyes: Conjunctivae are normal. Pupils are equal, round, and reactive to light. Right eye exhibits no discharge. Left eye exhibits no discharge. No scleral icterus. Neck: No thyromegaly present. Cardiovascular: Normal rate, regular rhythm and normal heart sounds. No murmur heard. Pulmonary/Chest: Effort normal and breath sounds normal. No respiratory distress. She has no wheezes. She has no rales. She exhibits no tenderness. Abdominal: Soft. She exhibits no distension. There is no tenderness. There is no rebound. Musculoskeletal: Normal range of motion. She exhibits no edema, tenderness or deformity. Lymphadenopathy:     She has no cervical adenopathy.    Neurological: She is alert and oriented to person, place, and time. No cranial nerve deficit. Coordination normal.   Skin: Skin is warm and dry. Rash noted. She is not diaphoretic. There is erythema. No pallor. Patient had erythematous skin rash was slight oozing in the left arm, and and multiple erythematous lesions they range about 2-3 cm in her lower extremity that are minimal lesion in the tarso   Psychiatric: She has a normal mood and affect. Her behavior is normal. Judgment and thought content normal.        Assessment and plan     1. Skin rash  Possible poison ivy !!  - mometasone (ELOCON) 0.1 % topical cream; Apply  to affected area two (2) times a day. Dispense: 50 g; Refill: 0    2. Irritant contact dermatitis due to plants, except food    - mometasone (ELOCON) 0.1 % topical cream; Apply  to affected area two (2) times a day. Dispense: 50 g; Refill: 0     have discussed the diagnosis with the patient and the intended plan as seen in the above orders. The patient has received an after-visit summary and questions were answered concerning future plans. I have discussed medication side effects and warnings with the patient as well. I have reviewed the plan of care with the patient, accepted their input and they are in agreement with the treatment goals. Current Outpatient Prescriptions   Medication Sig Dispense Refill    mometasone (ELOCON) 0.1 % topical cream Apply  to affected area two (2) times a day. 50 g 0    sertraline (ZOLOFT) 50 mg tablet Take 2 tabs daily for 30 days 60 Tab 5    ASCORBATE CALCIUM (VITAMIN C PO) Take 1,000 mg by mouth daily.  cpap machine kit by Does Not Apply route. Change to auto-CPAP at 5-8 cm with humidifier. Mask of choice. Length of need 99 months. Replace mask and accessories as needed x 12 months. Download data at 30 days and fax at [phone]. Dx- GARCÍA, excessive sleepiness, obesity. DME- [facility] 1 Kit 0    prasterone, dhea, (DHEA) 50 mg cap Take 1 Cap by mouth daily.       VITAMIN B COMPLEX (B COMPLEX PO) Take 1 tablet by mouth daily.  Glucosamine HCl 500 mg Tab Take 1 tablet by mouth daily.  Calcium-Cholecalciferol, D3, (CALCIUM 500 + D) 500 mg(1,250mg) -400 unit Tab Take 1,200 mg by mouth daily.  fish oil-dha-epa (FISH OIL) 1,200-144-216 mg Cap Take 1 capsule by mouth daily.  MULTIVITAMIN PO Take 1 tablet by mouth daily. Patient Active Problem List    Diagnosis Date Noted    Acute pain of right ear 05/14/2015    Heel spur 11/17/2014    Vaginal dryness 08/09/2014    TMJ (temporomandibular joint disorder)     Depressive disorder      Results for orders placed or performed during the hospital encounter of 03/24/18   OCCULT BLOOD, IMMUNOASSAY (FIT)   Result Value Ref Range    Occult blood fecal, by [address] Outpatient Visit on 03/24/2018   Component Date Value Ref Range Status    Occult blood fecal, by [place] 03/24/2018 NEGATIVE   NEGATIVE   Final    Comment: (NOTE)  Performed At: [address] [id number]  [address] [facility]:[id number]     Hospital Outpatient Visit on 03/23/2018   Component Date Value Ref Range Status    LIPID PROFILE 03/23/2018        Final    Cholesterol, total 03/23/2018 145  <200 MG/DL Final    Triglyceride 03/23/2018 84  <150 MG/DL Final    Comment: The drugs N-acetylcysteine (NAC) and  Metamiszole have been found to cause falsely  low results in this chemical assay. Please  be sure to submit blood samples obtained  BEFORE administration of either of these  drugs to assure correct results.       HDL Cholesterol 03/23/2018 49  40 - 60 MG/DL Final    LDL, calculated 03/23/2018 79.2  0 - 100 MG/DL Final    VLDL, calculated 03/23/2018 16.8  MG/DL Final    CHOL/HDL Ratio 03/23/2018 3.0  0 - 5.0   Final    Sodium 03/23/2018 140  136 - 145 mmol/L Final    Potassium 03/23/2018 4.3  3.5 - 5.5 mmol/L Final    Chloride 03/23/2018 105  100 - 108 mmol/L Final    CO2 03/23/2018 29  21 - 32 mmol/L Final    Anion gap 03/23/2018 6  3.0 - 18 mmol/L Final    Glucose 03/23/2018 96  74 - 99 mg/dL Final    BUN 03/23/2018 14  7.0 - 18 MG/DL Final    Creatinine 03/23/2018 0.85  0.6 - 1.3 MG/DL Final    BUN/Creatinine ratio 03/23/2018 16  12 - 20   Final    GFR est AA 03/23/2018 >60  >60 ml/min/1.73m2 Final    GFR est non-AA 03/23/2018 >60  >60 ml/min/1.73m2 Final    Comment: (NOTE)  Estimated GFR is calculated using the Modification of Diet in Renal   Disease (MDRD) Study equation, reported for both  Americans   (GFRAA) and non- Americans (GFRNA), and normalized to 1.73m2   body surface area. The physician must decide which value applies to   the patient. The MDRD study equation should only be used in   individuals age 25 or older. It has not been validated for the   following: pregnant women, patients with serious comorbid conditions,   or on certain medications, or persons with extremes of body size,   muscle mass, or nutritional status.  Calcium 03/23/2018 8.5  8.5 - 10.1 MG/DL Final    Bilirubin, total 03/23/2018 0.6  0.2 - 1.0 MG/DL Final    ALT (SGPT) 03/23/2018 59* 13 - 56 U/L Final    AST (SGOT) 03/23/2018 26  15 - 37 U/L Final    Alk.  phosphatase 03/23/2018 88  45 - 117 U/L Final    Protein, total 03/23/2018 7.3  6.4 - 8.2 g/dL Final    Albumin 03/23/2018 3.8  3.4 - 5.0 g/dL Final    Globulin 03/23/2018 3.5  2.0 - 4.0 g/dL Final    A-G Ratio 03/23/2018 1.1  0.8 - 1.7   Final    WBC 03/23/2018 6.3  4.6 - 13.2 K/uL Final    RBC 03/23/2018 4.42  4.20 - 5.30 M/uL Final    HGB 03/23/2018 14.5  12.0 - 16.0 g/dL Final    HCT 03/23/2018 43.9  35.0 - 45.0 % Final    MCV 03/23/2018 99.3* 74.0 - 97.0 FL Final    MCH 03/23/2018 32.8  24.0 - 34.0 PG Final    MCHC 03/23/2018 33.0  31.0 - 37.0 g/dL Final    RDW 03/23/2018 13.5  11.6 - 14.5 % Final    PLATELET 59/85/0034 520  135 - 420 K/uL Final    MPV 03/23/2018 11.3  9.2 - 11.8 FL Final    TSH 03/23/2018 1.38  0.36 - 3.74 uIU/mL Final Follow-up Disposition:  Return in about 1 week (around 5/7/2018), or if symptoms worsen or fail to improve.

## 2018-04-30 NOTE — PATIENT INSTRUCTIONS
Dermatitis: Care Instructions  Your Care Instructions  Dermatitis is the general name used for any rash or inflammation of the skin. Different kinds of dermatitis cause different kinds of rashes. Common causes of a rash include new medicines, plants (such as poison oak or poison ivy), heat, and stress. Certain illnesses can also cause a rash. An allergic reaction to something that touches your skin, such as latex, nickel, or poison ivy, is called contact dermatitis. Contact dermatitis may also be caused by something that irritates the skin, such as bleach, a chemical, or soap. These types of rashes cannot be spread from person to person. How long your rash will last depends on what caused it. Rashes may last a few days or months. Follow-up care is a key part of your treatment and safety. Be sure to make and go to all appointments, and call your doctor if you are having problems. It's also a good idea to know your test results and keep a list of the medicines you take. How can you care for yourself at home? · Do not scratch the rash. Cut your nails short, and file them smooth. Or wear gloves if this helps keep you from scratching. · Wash the area with water only. Pat dry. · Put cold, wet cloths on the rash to reduce itching. · Keep cool, and stay out of the sun. · Leave the rash open to the air as much as possible. · If the rash itches, use hydrocortisone cream. Follow the directions on the label. Calamine lotion may help for plant rashes. · Take an over-the-counter antihistamine, such as diphenhydramine (Benadryl) or loratadine (Claritin), to help calm the itching. Read and follow all instructions on the label. · If your doctor prescribed a cream, use it as directed. If your doctor prescribed medicine, take it exactly as directed. When should you call for help?   Call your doctor now or seek immediate medical care if:  ? · You have symptoms of infection, such as:  ¨ Increased pain, swelling, warmth, or redness. ¨ Red streaks leading from the area. ¨ Pus draining from the area. ¨ A fever. ? · You have joint pain along with the rash. ? Watch closely for changes in your health, and be sure to contact your doctor if:  ? · Your rash is changing or getting worse. ? · You are not getting better as expected. Where can you learn more? Go to http://jun-juan david.info/. Enter [phone] in the search box to learn more about \"Dermatitis: Care Instructions. \"  Current as of: October 13, 2016  Content Version: 11.4  © 0779-7307 [employer]. Care instructions adapted under license by [employer] (which disclaims liability or warranty for this information). If you have questions about a medical condition or this instruction, always ask your healthcare professional. [address] any warranty or liability for your use of this information. Poison [place], [place], and [place]: Care Instructions  Your Care Instructions    Poison ivy, poison oak, and poison sumac are plants that can cause a skin rash upon contact. The red, itchy rash often shows up in lines or streaks and may cause fluid-filled blisters or large, raised hives. The rash is caused by an allergic reaction to an oil in poison ivy, oak, and sumac. The rash may occur when you touch the plant or when you touch clothing, pet fur, sporting gear, gardening tools, or other objects that have come in contact with one of these plants. You cannot catch or spread the rash, even if you touch it or the blister fluid, because the plant oil will already have been absorbed or washed off the skin. The rash may seem to be spreading, but either it is still developing from earlier contact or you have touched something that still has the plant oil on it. Follow-up care is a key part of your treatment and safety. Be sure to make and go to all appointments, and call your doctor if you are having problems.  It's also a good idea to know your test results and keep a list of the medicines you take. How can you care for yourself at home? · If your doctor prescribed a cream, use it as directed. If your doctor prescribed medicine, take it exactly as prescribed. Call your doctor if you think you are having a problem with your medicine. · Use cold, wet cloths to reduce itching. · Keep cool, and stay out of the sun. · Leave the rash open to the air. · Wash all clothing or other things that may have come in contact with the plant oil. · Avoid most lotions and ointments until the rash heals. Calamine lotion may help relieve symptoms of a plant rash. Use it 3 or 4 times a day. To prevent poison ivy exposure  If you know that you will be near poison ivy, oak, or sumac, you can try these options:  · Use a product designed to help prevent plant oil from getting on the skin. These products, such as Ivy X Pre-Contact Skin Solution, come in lotions, sprays, or towelettes. You put the product on your skin right before you go outdoors. · If you did not use a preventive product and you have had contact with plant oil, clean it off your skin as soon as possible. Use a product such as [employer]ec[employer] Original Outdoor Skin Cleanser. These products can also be used to clean plant oil from clothing or tools. When should you call for help? Call your doctor now or seek immediate medical care if:  ? · Your rash gets worse, and you start to feel bad and have a fever, a stiff neck, nausea, and vomiting. ? · You have signs of infection, such as:  ¨ Increased pain, swelling, warmth, or redness. ¨ Red streaks leading from the rash. ¨ Pus draining from the rash. ¨ A fever. ? Watch closely for changes in your health, and be sure to contact your doctor if:  ? · You have new blisters or bruises, or the rash spreads and looks like a sunburn. ? · The rash gets worse, or it comes back after nearly disappearing.    ? · You think a medicine you are using is making your rash worse. ? · Your rash does not clear up after 1 to 2 weeks of home treatment. ? · You have joint aches or body aches with your rash. Where can you learn more? Go to http://jun-juan david.info/. Enter W849 in the search box to learn more about \"Poison [place], [place], and [place]: Care Instructions. \"  Current as of: October 13, 2016  Content Version: 11.4  © 5310-9384 [employer]. Care instructions adapted under license by [employer] (which disclaims liability or warranty for this information). If you have questions about a medical condition or this instruction, always ask your healthcare professional. [address] any warranty or liability for your use of this information.

## 2018-08-20 ENCOUNTER — OFFICE VISIT (OUTPATIENT)
Dept: FAMILY MEDICINE CLINIC | Age: 58
End: 2018-08-20

## 2018-08-20 VITALS
SYSTOLIC BLOOD PRESSURE: 136 MMHG | DIASTOLIC BLOOD PRESSURE: 80 MMHG | HEART RATE: 75 BPM | TEMPERATURE: 97 F | HEIGHT: 66 IN | WEIGHT: 250 LBS | RESPIRATION RATE: 16 BRPM | OXYGEN SATURATION: 98 % | BODY MASS INDEX: 40.18 KG/M2

## 2018-08-20 DIAGNOSIS — R12 HEARTBURN: ICD-10-CM

## 2018-08-20 DIAGNOSIS — R19.7 DIARRHEA, UNSPECIFIED TYPE: Primary | ICD-10-CM

## 2018-08-20 DIAGNOSIS — M79.672 LEFT FOOT PAIN: ICD-10-CM

## 2018-08-20 DIAGNOSIS — K59.00 CONSTIPATION, UNSPECIFIED CONSTIPATION TYPE: ICD-10-CM

## 2018-08-20 PROBLEM — E66.01 OBESITY, MORBID (HCC): Status: ACTIVE | Noted: 2018-08-20

## 2018-08-20 NOTE — MR AVS SNAPSHOT
[address] [place] [address] 
[phone] Patient: Avery Braxton MRN: [id number] RU Visit Information Date & Time Provider Department Dept. Phone Encounter #  
 2018  3:30 PM SAMEER [facility] [place] [phone] [id number] Upcoming Health Maintenance Date Due Hepatitis C Screening 1960 BREAST CANCER SCRN MAMMOGRAM 2015 Influenza Age 5 to Adult 2018* PAP AKA CERVICAL CYTOLOGY 2019 FOBT Q 1 YEAR AGE 50-75 3/24/2019 DTaP/Tdap/Td series (2 - Td) 2023 *Topic was postponed. The date shown is not the original due date. Allergies as of 2018  Review Complete On: 2018 By: Tori Ye LPN No Known Allergies Current Immunizations  Reviewed on 2016 Name Date Influenza Vaccine 2016, 2014 Tdap 2013  4:17 PM  
  
 Not reviewed this visit You Were Diagnosed With   
  
 Codes Comments Diarrhea, unspecified type    -  Primary ICD-10-CM: R19.7 ICD-9-CM: 787.91 Constipation, unspecified constipation type     ICD-10-CM: K59.00 ICD-9-CM: 564.00 Heartburn     ICD-10-CM: R12 
ICD-9-CM: 787.1 Vitals BP Pulse Temp Resp Height(growth percentile) Weight(growth percentile) 136/80 75 97 °F (36.1 °C) (Oral) 16 5' 5.98\" (1.676 m) 250 lb (113.4 kg) LMP SpO2 BMI OB Status Smoking Status 2012 98% 40.37 kg/m2 Postmenopausal Never Smoker Vitals History BMI and BSA Data Body Mass Index Body Surface Area  
 40.37 kg/m 2 2.3 m 2 Preferred Pharmacy Pharmacy Name Phone [address] Garrie Risk [phone] Your Updated Medication List  
  
   
This list is accurate as of 18  4:36 PM.  Always use your most recent med list.  
  
  
  
  
 B COMPLEX PO  
 Take 1 tablet by mouth daily. cpap machine kit  
by Does Not Apply route. Change to auto-CPAP at 5-8 cm with humidifier. Mask of choice. Length of need 99 months. Replace mask and accessories as needed x 12 months. Download data at 30 days and fax at [phone]. Dx- GARCÍA, excessive sleepiness, obesity. DME- Megan DHEA 50 mg Cap Generic drug:  prasterone (dhea) Take 1 Cap by mouth daily. FISH OIL 1,200-144-216 mg Cap Generic drug:  fish oil-dha-epa Take 1 capsule by mouth daily. Glucosamine HCl 500 mg Tab Take 1 tablet by mouth daily. MULTIVITAMIN PO Take 1 tablet by mouth daily. sertraline 50 mg tablet Commonly known as:  ZOLOFT Take 2 tabs daily for 30 days VITAMIN C PO Take 1,000 mg by mouth daily. We Performed the Following REFERRAL TO GASTROENTEROLOGY [LNI46 Custom] Referral Information Referral ID Referred By Referred To  
  
 [id number] Radha Etienne Not Available Visits Status Start Date End Date 1 New Request 8/20/18 8/20/19 If your referral has a status of pending review or denied, additional information will be sent to support the outcome of this decision. Introducing [facility]! Alison López introduces State patient portal. Now you can access parts of your medical record, email your doctor's office, and request medication refills online. 1. In your internet browser, go to [email or web address] 2. Click on the First Time User? Click Here link in the Sign In box. You will see the New Member Sign Up page. 3. Enter your State Access Code exactly as it appears below. You will not need to use this code after youve completed the sign-up process. If you do not sign up before the expiration date, you must request a new code. · State Access Code: [email or web address] Expires: 11/18/2018  4:36 PM 
 
4.  Enter the last four digits of your Social Security Number (xxxx) and Date of Birth (mm/dd/yyyy) as indicated and click Submit. You will be taken to the next sign-up page. 5. Create a [employer] ID. This will be your [employer] login ID and cannot be changed, so think of one that is secure and easy to remember. 6. Create a [employer] password. You can change your password at any time. 7. Enter your Password Reset Question and Answer. This can be used at a later time if you forget your password. 8. Enter your e-mail address. You will receive e-mail notification when new information is available in [address] 9. Click Sign Up. You can now view and download portions of your medical record. 10. Click the Download Summary menu link to download a portable copy of your medical information. If you have questions, please visit the Frequently Asked Questions section of the [employer] website. Remember, [employer] is NOT to be used for urgent needs. For medical emergencies, dial 911. Now available from your iPhone and Android! Please provide this summary of care documentation to your next provider. Your primary care clinician is listed as Asmita Edgar. If you have any questions after today's visit, please call [phone].

## 2018-08-20 NOTE — PROGRESS NOTES
Racheal Marcano is a 63 y.o. female presents in office to be seen for rectal leakage x 1 month. Left foot swelling/ numbness, and acid reflux. Health Maintenance Due   Topic Date Due    Hepatitis C Screening  1960    BREAST CANCER SCRN MAMMOGRAM  11/05/2015       1. Have you been to the ER, urgent care clinic since your last visit? Hospitalized since your last visit?no    2. Have you seen or consulted any other health care providers outside of the [address] [place] [address] since your last visit? Include any pap smears or colon screening.  no

## 2018-08-21 NOTE — PROGRESS NOTES
HISTORY OF PRESENT ILLNESS  Vianney Krause is a 63 y.o. female with having irregular bowel movements and frequent anal leakage for about three months. She denies any nausea or abdominal pain. She will have frequent and loose stool several times a month, occasionally constipation for a couple of days and other days normal formed stool. Several times a week she randomly has some anal leakage, whether it is if she is starting to exercise, walking or just relaxing. She has also had frequent heartburn and has sometimes felt the burning sensation up into her throat and an acidic taste in her mouth. Prior to the GI symptoms starting or the heartburn, there was no diet change or change in medications. She has also had left dorsal foot pain for a couple of years and did have an xray done a couple of years ago. She denies any injury to it. Recently the pain has occasionally gotten worse if she on her feet more than a short period of time and she has noticed a little bit of swelling sometimes where it hurts. Other   The history is provided by the patient. This is a new problem. The current episode started more than 1 week ago. The problem occurs every several days. The problem has not changed since onset. Pertinent negatives include no chest pain, no abdominal pain and no headaches. Nothing aggravates the symptoms. Nothing relieves the symptoms. Foot Swelling    This is a recurrent problem. The current episode started more than 1 week ago. The problem has been gradually worsening. The pain is present in the left foot. Pain severity now: mild to moderate. The symptoms are aggravated by standing and activity. She has tried rest for the symptoms. There has been no history of extremity trauma. Heartburn    This is a new problem. The current episode started more than 1 week ago. The problem occurs every several days. The problem has not changed since onset. Pertinent negatives include no fever, no nausea, no vomiting, no headaches and no chest pain. Nothing worsens the pain. Review of Systems   Constitutional: Negative for chills, fever and malaise/fatigue. Cardiovascular: Negative for chest pain. Gastrointestinal: Positive for heartburn. Negative for abdominal pain, nausea and vomiting. Genitourinary: Negative. Neurological: Negative for dizziness and headaches. Physical Exam   Constitutional: She appears well-nourished. No distress. Cardiovascular: Normal rate and regular rhythm. Pulmonary/Chest: Effort normal and breath sounds normal.   Abdominal: Soft. She exhibits no distension. There is no tenderness. Musculoskeletal: Normal range of motion. She exhibits no edema or deformity. ASSESSMENT and PLAN    ICD-10-CM ICD-9-CM    1. Diarrhea, unspecified type R19.7 787.91 REFERRAL TO GASTROENTEROLOGY   2. Constipation, unspecified constipation type K59.00 564.00 REFERRAL TO GASTROENTEROLOGY   3. Heartburn R12 787.1 REFERRAL TO GASTROENTEROLOGY   4. Left foot pain M79.672 729.5      Patient given referral to GI for further evaluation. She is advised to start Prilosec.

## 2018-11-01 DIAGNOSIS — F32.A DEPRESSIVE DISORDER: ICD-10-CM

## 2018-11-01 RX ORDER — SERTRALINE HYDROCHLORIDE 50 MG/1
TABLET, FILM COATED ORAL
Qty: 90 TAB | Refills: 1 | Status: SHIPPED | OUTPATIENT
Start: 2018-11-01 | End: 2019-01-29 | Stop reason: SDUPTHER

## 2018-11-01 NOTE — TELEPHONE ENCOUNTER
Dr. Sandhu Spotted, please see refill request for patient in [facility]. Thank you!     Requested Prescriptions     Pending Prescriptions Disp Refills    sertraline (ZOLOFT) 50 mg tablet 90 Tab 1     Sig: Take 2 tabs daily for 30 days

## 2019-01-29 DIAGNOSIS — F32.A DEPRESSIVE DISORDER: ICD-10-CM

## 2019-01-29 RX ORDER — SERTRALINE HYDROCHLORIDE 50 MG/1
TABLET, FILM COATED ORAL
Qty: 90 TAB | Refills: 1 | Status: SHIPPED | OUTPATIENT
Start: 2019-01-29 | End: 2019-04-26 | Stop reason: SDUPTHER

## 2019-04-26 DIAGNOSIS — F32.A DEPRESSIVE DISORDER: ICD-10-CM

## 2019-04-28 RX ORDER — SERTRALINE HYDROCHLORIDE 50 MG/1
TABLET, FILM COATED ORAL
Qty: 90 TAB | Refills: 0 | Status: SHIPPED | OUTPATIENT
Start: 2019-04-28 | End: 2019-06-15 | Stop reason: SDUPTHER

## 2019-06-15 DIAGNOSIS — F32.A DEPRESSIVE DISORDER: ICD-10-CM

## 2019-06-17 RX ORDER — SERTRALINE HYDROCHLORIDE 50 MG/1
TABLET, FILM COATED ORAL
Qty: 90 TAB | Refills: 0 | Status: SHIPPED | OUTPATIENT
Start: 2019-06-17 | End: 2019-07-09 | Stop reason: SDUPTHER

## 2019-07-09 ENCOUNTER — OFFICE VISIT (OUTPATIENT)
Dept: FAMILY MEDICINE CLINIC | Age: 59
End: 2019-07-09

## 2019-07-09 VITALS
WEIGHT: 237 LBS | BODY MASS INDEX: 39.49 KG/M2 | HEIGHT: 65 IN | RESPIRATION RATE: 17 BRPM | HEART RATE: 69 BPM | OXYGEN SATURATION: 99 % | DIASTOLIC BLOOD PRESSURE: 76 MMHG | TEMPERATURE: 97.9 F | SYSTOLIC BLOOD PRESSURE: 123 MMHG

## 2019-07-09 DIAGNOSIS — Z23 NEED FOR SHINGLES VACCINE: ICD-10-CM

## 2019-07-09 DIAGNOSIS — E66.01 OBESITY, MORBID (HCC): ICD-10-CM

## 2019-07-09 DIAGNOSIS — F32.A DEPRESSIVE DISORDER: ICD-10-CM

## 2019-07-09 DIAGNOSIS — G47.33 OBSTRUCTIVE SLEEP APNEA SYNDROME: Primary | ICD-10-CM

## 2019-07-09 PROBLEM — G47.30 SLEEP APNEA: Status: ACTIVE | Noted: 2019-07-09

## 2019-07-09 RX ORDER — SERTRALINE HYDROCHLORIDE 50 MG/1
TABLET, FILM COATED ORAL DAILY
COMMUNITY
End: 2019-07-09 | Stop reason: SDUPTHER

## 2019-07-09 RX ORDER — LANOLIN ALCOHOL/MO/W.PET/CERES
1000 CREAM (GRAM) TOPICAL DAILY
COMMUNITY
End: 2021-08-13

## 2019-07-09 RX ORDER — SERTRALINE HYDROCHLORIDE 50 MG/1
100 TABLET, FILM COATED ORAL DAILY
Qty: 180 TAB | Refills: 0 | Status: SHIPPED | OUTPATIENT
Start: 2019-07-09 | End: 2019-10-16 | Stop reason: SDUPTHER

## 2019-07-09 NOTE — PROGRESS NOTES
Bennie Aguillon is a 63 y.o. female presents to office for establish care and depression.      Medication list has been reviewed with Bennie Aguillon and updated as of today's date     Health Maintenance items with a due date reviewed with patient:  Health Maintenance Due   Topic Date Due    Hepatitis C Screening  1960    Shingrix Vaccine Age 50> (1 of 2) 05/24/2011    BREAST CANCER SCRN MAMMOGRAM  11/05/2015    PAP AKA CERVICAL CYTOLOGY  02/09/2019    FOBT Q 1 YEAR AGE 50-75  03/24/2019

## 2019-07-10 NOTE — PROGRESS NOTES
Yuri Guzman     Chief Complaint   Patient presents with    Establish Care    Depression     Vitals:    07/09/19 1536   BP: 123/76   Pulse: 69   Resp: 17   Temp: 97.9 °F (36.6 °C)   TempSrc: Oral   SpO2: 99%   Weight: 237 lb (107.5 kg)   Height: 5' 5\" (1.651 m)   PainSc:   0 - No pain   LMP: 04/08/2012         HPI: Ms. Zahra Yousif is here to establish care with a new provider previous PCP Ms. Weeks has left the clinic, patient has no acute complaint today she would like refill medication on Zoloft she was taking 100 mg daily that was controlling her symptoms, and few weeks ago patient decided to decrease her dose from 100 down to 50 mg, but now she started noticing that she is more irritable and getting more angry with more irritability, so she resumed her dose of 2 tablet daily since yesterday. Patient should be due for shingles vaccine prescription will be given to the patient today. Patient is also due for mammogram and Pap smear    And she is due for annual physical examination that was was last time done in March 2018.     Past Medical History:   Diagnosis Date    Depressive disorder, not elsewhere classified     Gynecologic exam normal     Dr. Miladis Draper H/O bone density study 9/2013    normal    Hip pain 9/2014    Osteophyte     bilat knees    Sleep apnea     TMJ (temporomandibular joint disorder)      Past Surgical History:   Procedure Laterality Date    HC MAMMO DIAG BILAT      11/10    HX CYSTECTOMY      L shoulder    HX ORTHOPAEDIC      lipoma removed     Social History     Tobacco Use    Smoking status: Never Smoker    Smokeless tobacco: Never Used   Substance Use Topics    Alcohol use: Yes     Comment: occasionally       Family History   Problem Relation Age of Onset    Thyroid Disease Mother     Pneumonia Mother     Dementia Father     Stroke Father     Diabetes Sister     Elevated Lipids Sister     Hypertension Sister     No Known Problems Brother     No Known Problems Brother     Heart Disease Maternal Grandmother             Heart Disease Maternal Aunt        Review of Systems   Constitutional: Negative for chills, fever, malaise/fatigue and weight loss. HENT: Negative for congestion, ear discharge, ear pain, hearing loss and nosebleeds. Eyes: Negative for blurred vision, double vision and discharge. Respiratory: Negative for cough. Cardiovascular: Negative for chest pain, palpitations, claudication and leg swelling. Gastrointestinal: Negative for abdominal pain, constipation, diarrhea, nausea and vomiting. Genitourinary: Negative for dysuria, frequency and urgency. Musculoskeletal: Negative for myalgias. Skin: Negative for itching and rash. Neurological: Negative for dizziness, tingling, sensory change, speech change, focal weakness, weakness and headaches. Psychiatric/Behavioral: Negative for depression, hallucinations, substance abuse and suicidal ideas. The patient is not nervous/anxious and does not have insomnia. Physical Exam   Constitutional: She is oriented to person, place, and time. She appears well-developed and well-nourished. No distress. HENT:   Head: Normocephalic and atraumatic. Mouth/Throat: Oropharynx is clear and moist. No oropharyngeal exudate. Eyes: Pupils are equal, round, and reactive to light. Conjunctivae are normal. Right eye exhibits no discharge. Left eye exhibits no discharge. No scleral icterus. Neck: Normal range of motion. Neck supple. No thyromegaly present. Cardiovascular: Normal rate, regular rhythm and normal heart sounds. No murmur heard. Pulmonary/Chest: Effort normal and breath sounds normal. No respiratory distress. She has no wheezes. She has no rales. She exhibits no tenderness. Abdominal: Soft. She exhibits no distension. There is no tenderness. There is no rebound. Musculoskeletal: Normal range of motion. She exhibits no edema, tenderness or deformity.    Lymphadenopathy:     She has no cervical adenopathy. Neurological: She is alert and oriented to person, place, and time. No cranial nerve deficit. Coordination normal.   Skin: Skin is warm and dry. No rash noted. She is not diaphoretic. No erythema. No pallor. Psychiatric: She has a normal mood and affect. Her behavior is normal. Judgment and thought content normal.   Nursing note and vitals reviewed. Assessment and plan     Plan of care has been discussed with the patient, he agrees to the plan and verbalized understanding. All his questions were answered  More than 50% of the time spent in this visit was counseling the patient about  illness and treatment options         1. Obstructive sleep apnea syndrome  Patient is not using her CPAP currently as a matter of her preference enjoys    2. Depressive disorder  Stable currently she will resume 100 mg 2 tablet over 50 mg  - sertraline (ZOLOFT) 50 mg tablet; Take 2 Tabs by mouth daily for 90 days. Dispense: 180 Tab; Refill: 0    3. Need for shingles vaccine    - varicella-zoster recombinant, PF, (SHINGRIX, PF,) 50 mcg/0.5 mL susr injection; 0.5 mL by IntraMUSCular route once for 1 dose. Dispense: 0.5 mL; Refill: 0    4. Obesity, morbid ([address]  Patient is already lost over 20 pounds in the last 6 months by following vegetarian healthy diet and increasing her activities. Patient will continue with her weight loss program.    Current Outpatient Medications   Medication Sig Dispense Refill    cyanocobalamin 1,000 mcg tablet Take 5,000 mcg by mouth daily.  sertraline (ZOLOFT) 50 mg tablet Take 2 Tabs by mouth daily for 90 days. 180 Tab 0    ASCORBATE CALCIUM (VITAMIN C PO) Take 1,000 mg by mouth daily.  VITAMIN B COMPLEX (B COMPLEX PO) Take 1 tablet by mouth daily.  Glucosamine HCl 500 mg Tab Take 1 tablet by mouth daily.  fish oil-dha-epa (FISH OIL) 1,200-144-216 mg Cap Take 1 capsule by mouth daily.  MULTIVITAMIN PO Take 1 tablet by mouth daily.       cpap machine kit by Does Not Apply route. Change to auto-CPAP at 5-8 cm with humidifier. Mask of choice. Length of need 99 months. Replace mask and accessories as needed x 12 months. Download data at 30 days and fax at [phone]. Dx- GARCÍA, excessive sleepiness, obesity. DME- [facility] 1 Kit 0       Patient Active Problem List    Diagnosis Date Noted    Sleep apnea 07/09/2019    Obesity, morbid ([address] 08/20/2018    Acute pain of right ear 05/14/2015    Heel spur 11/17/2014    Vaginal dryness 08/09/2014    TMJ (temporomandibular joint disorder)     Depressive disorder      Results for orders placed or performed during the hospital encounter of 03/24/18   OCCULT BLOOD, IMMUNOASSAY (FIT)   Result Value Ref Range    Occult blood fecal, by IA NEGATIVE  NEGATIVE       No visits with results within 3 Month(s) from this visit. Latest known visit with results is:   Hospital Outpatient Visit on 03/24/2018   Component Date Value Ref Range Status    Occult blood fecal, by IA 03/24/2018 NEGATIVE   NEGATIVE   Final    Comment: (NOTE)  Performed At: [address] [id number]  Nathan George MD AM:[id number]            Follow-up and Dispositions    · Return in about 3 months (around 10/9/2019).

## 2019-10-08 ENCOUNTER — OFFICE VISIT (OUTPATIENT)
Dept: FAMILY MEDICINE CLINIC | Age: 59
End: 2019-10-08

## 2019-10-08 VITALS
RESPIRATION RATE: 20 BRPM | SYSTOLIC BLOOD PRESSURE: 133 MMHG | WEIGHT: 241.2 LBS | TEMPERATURE: 97.9 F | DIASTOLIC BLOOD PRESSURE: 88 MMHG | OXYGEN SATURATION: 97 % | HEART RATE: 77 BPM | HEIGHT: 65 IN | BODY MASS INDEX: 40.19 KG/M2

## 2019-10-08 DIAGNOSIS — G47.33 OBSTRUCTIVE SLEEP APNEA SYNDROME: ICD-10-CM

## 2019-10-08 DIAGNOSIS — E55.9 VITAMIN D DEFICIENCY: ICD-10-CM

## 2019-10-08 DIAGNOSIS — F32.A DEPRESSIVE DISORDER: ICD-10-CM

## 2019-10-08 DIAGNOSIS — E66.01 OBESITY, MORBID (HCC): ICD-10-CM

## 2019-10-08 DIAGNOSIS — Z00.00 ANNUAL PHYSICAL EXAM: Primary | ICD-10-CM

## 2019-10-08 DIAGNOSIS — E53.8 VITAMIN B 12 DEFICIENCY: ICD-10-CM

## 2019-10-08 LAB
ABSOLUTE LYMPHOCYTE COUNT, 10803: 2.2 K/UL (ref 1–4.8)
BASOPHILS # BLD: 0.1 K/UL (ref 0–0.2)
BASOPHILS NFR BLD: 1 % (ref 0–2)
EOSINOPHIL # BLD: 0.2 K/UL (ref 0–0.5)
EOSINOPHIL NFR BLD: 4 % (ref 0–6)
ERYTHROCYTE [DISTWIDTH] IN BLOOD BY AUTOMATED COUNT: 13.7 % (ref 10–15.5)
GRANULOCYTES,GRANS: 54 % (ref 40–75)
HCT VFR BLD AUTO: 44 % (ref 35.1–48)
HGB BLD-MCNC: 14.4 G/DL (ref 11.7–16)
LYMPHOCYTES, LYMLT: 35 % (ref 20–45)
MCH RBC QN AUTO: 33 PG (ref 26–34)
MCHC RBC AUTO-ENTMCNC: 33 G/DL (ref 31–36)
MCV RBC AUTO: 100 FL (ref 80–95)
MONOCYTES # BLD: 0.4 K/UL (ref 0.1–1)
MONOCYTES NFR BLD: 6 % (ref 3–12)
NEUTROPHILS # BLD AUTO: 3.5 K/UL (ref 1.8–7.7)
PLATELET # BLD AUTO: 224 K/UL (ref 140–440)
PMV BLD AUTO: 11.3 FL (ref 9–13)
RBC # BLD AUTO: 4.41 M/UL (ref 3.8–5.2)
WBC # BLD AUTO: 6.4 K/UL (ref 4–11)

## 2019-10-08 NOTE — PROGRESS NOTES
Eufemia Sanches is a 63 y.o. female presents in office for CPE. Health Maintenance Due   Topic Date Due    Hepatitis C Screening  1960    BREAST CANCER SCRN MAMMOGRAM  11/05/2015    PAP AKA CERVICAL CYTOLOGY  02/09/2019    Influenza Age 9 to Adult  08/01/2019       1. Have you been to the ER, urgent care clinic since your last visit? Hospitalized since your last visit? No    2. Have you seen or consulted any other health care providers outside of the [address] [place] [address] since your last visit? Include any pap smears or colon screening. No      Patient states she had pap smear \"few months ago\" with Dr. Traci Lincoln. I will have patient sign GUY. She states she had mammogram in May. I will have patient sign GUY. Patient states she will get flu vaccine through work. Patient had colonoscopy 09-17-18. She was told to repeat in 10 years.

## 2019-10-08 NOTE — PROGRESS NOTES
Meredith Heller     Chief Complaint   Patient presents with    Complete Physical     Vitals:    10/08/19 0845   BP: 133/88   Pulse: 77   Resp: 20   Temp: 97.9 °F (36.6 °C)   TempSrc: Oral   SpO2: 97%   Weight: 241 lb 3.2 oz (109.4 kg)   Height: 5' 5\" (1.651 m)   PainSc:   7   PainLoc: Knee   LMP: 2012         HPI: Patient is here for annual physical examination. She has no acute complaint today.       She has depression which is well controlled with Zoloft 50 mg daily    Patient is obese with BMI of 40 and she has gained 4 pounds since last visit lifestyle modifications been discussed again I encouraged the patient to see dietitian she agreed      Patient did have a round well woman visit including Pap smear mammogram with OB/GYN Dr. Dary Hoyt MD    She signed a release of information to get records      Patient had declined flu vaccine today      Patient is up-to-date colon cancer screening she is due on  2028   Past Medical History:   Diagnosis Date    Depressive disorder, not elsewhere classified     Gynecologic exam normal     Dr. Cyril Dillon H/O bone density study 2013    normal    Hip pain 2014    Osteophyte     bilat knees    Sleep apnea     TMJ (temporomandibular joint disorder)      Past Surgical History:   Procedure Laterality Date    HC MAMMO DIAG BILAT      11/10    HX CYSTECTOMY      L shoulder    HX ORTHOPAEDIC      lipoma removed     Social History     Tobacco Use    Smoking status: Never Smoker    Smokeless tobacco: Never Used   Substance Use Topics    Alcohol use: Yes     Comment: occasionally       Family History   Problem Relation Age of Onset    Thyroid Disease Mother     Pneumonia Mother     Dementia Father     Stroke Father     Diabetes Sister     Elevated Lipids Sister     Hypertension Sister     No Known Problems Brother     No Known Problems Brother     Heart Disease Maternal Grandmother             Heart Disease Maternal Aunt Review of Systems   Constitutional: Negative for chills, fever, malaise/fatigue and weight loss. HENT: Negative for congestion, ear discharge, ear pain, hearing loss, nosebleeds, sinus pain and sore throat. Eyes: Negative for blurred vision, double vision and discharge. Respiratory: Negative for cough, hemoptysis, sputum production, shortness of breath and wheezing. Cardiovascular: Negative for chest pain, palpitations, claudication and leg swelling. Gastrointestinal: Negative for abdominal pain, constipation, diarrhea, nausea and vomiting. Genitourinary: Negative for dysuria, frequency and urgency. Musculoskeletal: Positive for joint pain. Negative for back pain, myalgias and neck pain. Skin: Negative for itching and rash. Neurological: Negative for dizziness, tingling, sensory change, speech change, focal weakness, weakness and headaches. Psychiatric/Behavioral: Negative for depression and suicidal ideas. Physical Exam   Constitutional: She is oriented to person, place, and time. She appears well-developed and well-nourished. No distress. HENT:   Head: Normocephalic and atraumatic. Mouth/Throat: Oropharynx is clear and moist. No oropharyngeal exudate. Eyes: Pupils are equal, round, and reactive to light. Conjunctivae are normal. Right eye exhibits no discharge. Left eye exhibits no discharge. No scleral icterus. Neck: Normal range of motion. Neck supple. No thyromegaly present. Cardiovascular: Normal rate, regular rhythm and normal heart sounds. No murmur heard. Pulmonary/Chest: Effort normal and breath sounds normal. No respiratory distress. She has no wheezes. She has no rales. She exhibits no tenderness. Abdominal: Soft. She exhibits no distension. There is no tenderness. There is no rebound. Musculoskeletal: Normal range of motion. She exhibits no edema, tenderness or deformity. Lymphadenopathy:     She has no cervical adenopathy.    Neurological: She is alert and oriented to person, place, and time. No cranial nerve deficit. Coordination normal.   Skin: Skin is warm and dry. No rash noted. She is not diaphoretic. No erythema. No pallor. Psychiatric: She has a normal mood and affect. Her behavior is normal. Judgment and thought content normal.   Nursing note and vitals reviewed. Assessment and plan     Plan of care has been discussed with the patient, he agrees to the plan and verbalized understanding. All his questions were answered  More than 50% of the time spent in this visit was counseling the patient about  illness and treatment options         1. Depressive disorder  Stable on Zoloft 50 mg daily  2. Obesity, morbid ([facility] [address]    - REFERRAL TO PHYSICAL THERAPY    3. Obstructive sleep apnea syndrome  Patient is not using her CPAP currently    4. Annual physical exam    - CBC WITH AUTOMATED DIFF; Future  - LIPID PANEL; Future  - METABOLIC PANEL, COMPREHENSIVE; Future  - METABOLIC PANEL, COMPREHENSIVE  - LIPID PANEL  - CBC WITH AUTOMATED DIFF    5. Vitamin D deficiency    - VITAMIN D, 25 HYDROXY; Future  - VITAMIN D, 25 HYDROXY    6. Vitamin B 12 deficiency    - VITAMIN B12; Future  - VITAMIN B12    Current Outpatient Medications   Medication Sig Dispense Refill    calcium carbonate (CALCIUM 600 PO) Take 2 Caps by mouth daily.  cyanocobalamin 1,000 mcg tablet Take 1,000 mcg by mouth daily.  sertraline (ZOLOFT) 50 mg tablet Take 2 Tabs by mouth daily for 90 days. 180 Tab 0    ASCORBATE CALCIUM (VITAMIN C PO) Take 1,000 mg by mouth daily.  VITAMIN B COMPLEX (B COMPLEX PO) Take 1 tablet by mouth daily.  Glucosamine HCl 500 mg Tab Take 1 tablet by mouth daily.  fish oil-dha-epa (FISH OIL) 1,200-144-216 mg Cap Take 1 capsule by mouth daily.  MULTIVITAMIN PO Take 1 tablet by mouth daily.  cpap machine kit by Does Not Apply route. Change to auto-CPAP at 5-8 cm with humidifier. Mask of choice. Length of need 99 months.  Replace mask and accessories as needed x 12 months. Download data at 30 days and fax at [phone]. Dx- GARCÍA, excessive sleepiness, obesity. DME- [facility] 1 Kit 0       Patient Active Problem List    Diagnosis Date Noted    Sleep apnea 07/09/2019    Obesity, morbid ([address] 08/20/2018    Acute pain of right ear 05/14/2015    Heel spur 11/17/2014    Vaginal dryness 08/09/2014    TMJ (temporomandibular joint disorder)     Depressive disorder      Results for orders placed or performed in visit on 10/08/19   VITAMIN D, 25 HYDROXY   Result Value Ref Range    VITAMIN D, 25-HYDROXY 44.4 32.0 - 100.0 ng/mL   VITAMIN B12   Result Value Ref Range    Vitamin B12 >2000 (H) 211 - 372 pg/mL   METABOLIC PANEL, COMPREHENSIVE   Result Value Ref Range    Glucose 96 70 - 99 mg/dL    BUN 15 6 - 22 mg/dL    Creatinine 0.7 0.5 - 1.2 mg/dL    Sodium 142 133 - 145 mmol/L    Potassium 4.4 3.5 - 5.5 mmol/L    Chloride 103 98 - 110 mmol/L    CO2 25 20 - 32 mmol/L    AST (SGOT) 25 10 - 37 U/L    ALT (SGPT) 28 5 - 40 U/L    Alk.  phosphatase 86 25 - 115 U/L    Bilirubin, total 0.6 0.2 - 1.2 mg/dL    Calcium 9.4 8.4 - 10.5 mg/dL    Protein, total 7.4 6.4 - 8.3 g/dL    Albumin 4.6 3.5 - 5.0 g/dL    A-G Ratio 1.6 1.1 - 2.6 ratio    Globulin 2.8 2.0 - 4.0 g/dL    Anion gap 14.0 mmol/L    GFRAA >60.0 >60.0    GFRNA >60.0 >60.0   LIPID PANEL   Result Value Ref Range    Triglyceride 91 40 - 149 mg/dL    HDL Cholesterol 74 (H) 40 - 59 mg/dL    Cholesterol, total 209 (H) 110 - 200 mg/dL    CHOLESTEROL/HDL 2.8 0.0 - 5.0    LDL, calculated 117 (H) 50 - 99 mg/dL    VLDL, calculated 18 8 - 30 mg/dL   CBC WITH AUTOMATED DIFF   Result Value Ref Range    WBC 6.4 4.0 - 11.0 K/uL    RBC 4.41 3.80 - 5.20 M/uL    HGB 14.4 11.7 - 16.0 g/dL    HCT 44.0 35.1 - 48.0 %     (H) 80 - 95 fL    MCH 33 26 - 34 pg    MCHC 33 31 - 36 g/dL    RDW 13.7 10.0 - 15.5 %    PLATELET 504 047 - 196 K/uL    MPV 11.3 9.0 - 13.0 fL    NEUTROPHILS 54 40 - 75 %    Lymphocytes 35 20 - 45 %    MONOCYTES 6 3 - 12 %    EOSINOPHILS 4 0 - 6 %    BASOPHILS 1 0 - 2 %    ABS. NEUTROPHILS 3.5 1.8 - 7.7 K/uL    ABSOLUTE LYMPHOCYTE COUNT 2.2 1.0 - 4.8 K/uL    ABS. MONOCYTES 0.4 0.1 - 1.0 K/uL    ABS. EOSINOPHILS 0.2 0.0 - 0.5 K/uL    ABS. BASOPHILS 0.1 0.0 - 0.2 K/uL     Office Visit on 10/08/2019   Component Date Value Ref Range Status    VITAMIN D, 25-HYDROXY 10/08/2019 44.4  32.0 - 100.0 ng/mL Final    Vitamin B12 10/08/2019 >2000* 211 - 911 pg/mL Final    Glucose 10/08/2019 96  70 - 99 mg/dL Final    BUN 10/08/2019 15  6 - 22 mg/dL Final    Creatinine 10/08/2019 0.7  0.5 - 1.2 mg/dL Final    Sodium 10/08/2019 142  133 - 145 mmol/L Final    Potassium 10/08/2019 4.4  3.5 - 5.5 mmol/L Final    Chloride 10/08/2019 103  98 - 110 mmol/L Final    CO2 10/08/2019 25  20 - 32 mmol/L Final    AST (SGOT) 10/08/2019 25  10 - 37 U/L Final    ALT (SGPT) 10/08/2019 28  5 - 40 U/L Final    Alk. phosphatase 10/08/2019 86  25 - 115 U/L Final    Bilirubin, total 10/08/2019 0.6  0.2 - 1.2 mg/dL Final    Calcium 10/08/2019 9.4  8.4 - 10.5 mg/dL Final    Protein, total 10/08/2019 7.4  6.4 - 8.3 g/dL Final    Albumin 10/08/2019 4.6  3.5 - 5.0 g/dL Final    A-G Ratio 10/08/2019 1.6  1.1 - 2.6 ratio Final    Globulin 10/08/2019 2.8  2.0 - 4.0 g/dL Final    Anion gap 10/08/2019 14.0  mmol/L Final    Comment: Estimated GFR results are reported in mL/min/1.73 sq.m. by the MDRD equation. This eGFR is validated for stable chronic renal failure patients. This   equation  is unreliable in acute illness or patients with normal renal function.       GFRAA 10/08/2019 >60.0  >60.0 Final    GFRNA 10/08/2019 >60.0  >60.0 Final    Triglyceride 10/08/2019 91  40 - 149 mg/dL Final    HDL Cholesterol 10/08/2019 74* 40 - 59 mg/dL Final    Cholesterol, total 10/08/2019 209* 110 - 200 mg/dL Final    CHOLESTEROL/HDL 10/08/2019 2.8  0.0 - 5.0 Final    LDL, calculated 10/08/2019 117* 50 - 99 mg/dL Final    VLDL, calculated 10/08/2019 18  8 - 30 mg/dL Final    Comment: Cholesterol Recommended NCEP guidelines in mg/dL:  Less than 200            Desirable  200 - 239                Borderline High  Greater than or  = 240   High  Please Note:  Total Chol/HDL Ratio                   Men     Women  1/2 Avg. Risk    3.4     3.3      Avg. Risk    5.0     4.4  2X  Avg. Risk    9.6     7.1  3X  Avg. Risk   23.4    11.0      WBC 10/08/2019 6.4  4.0 - 11.0 K/uL Final    RBC 10/08/2019 4.41  3.80 - 5.20 M/uL Final    HGB 10/08/2019 14.4  11.7 - 16.0 g/dL Final    HCT 10/08/2019 44.0  35.1 - 48.0 % Final    MCV 10/08/2019 100* 80 - 95 fL Final    MCH 10/08/2019 33  26 - 34 pg Final    MCHC 10/08/2019 33  31 - 36 g/dL Final    RDW 10/08/2019 13.7  10.0 - 15.5 % Final    PLATELET 67/91/0111 272  140 - 440 K/uL Final    MPV 10/08/2019 11.3  9.0 - 13.0 fL Final    NEUTROPHILS 10/08/2019 54  40 - 75 % Final    Lymphocytes 10/08/2019 35  20 - 45 % Final    MONOCYTES 10/08/2019 6  3 - 12 % Final    EOSINOPHILS 10/08/2019 4  0 - 6 % Final    BASOPHILS 10/08/2019 1  0 - 2 % Final    ABS. NEUTROPHILS 10/08/2019 3.5  1.8 - 7.7 K/uL Final    ABSOLUTE LYMPHOCYTE COUNT 10/08/2019 2.2  1.0 - 4.8 K/uL Final    ABS. MONOCYTES 10/08/2019 0.4  0.1 - 1.0 K/uL Final    ABS. EOSINOPHILS 10/08/2019 0.2  0.0 - 0.5 K/uL Final    ABS. BASOPHILS 10/08/2019 0.1  0.0 - 0.2 K/uL Final          Follow-up and Dispositions    · Return in about 6 months (around 4/8/2020) for as per results.

## 2019-10-09 LAB
25(OH)D3 SERPL-MCNC: 44.4 NG/ML (ref 32–100)
A-G RATIO,AGRAT: 1.6 RATIO (ref 1.1–2.6)
ALBUMIN SERPL-MCNC: 4.6 G/DL (ref 3.5–5)
ALP SERPL-CCNC: 86 U/L (ref 25–115)
ALT SERPL-CCNC: 28 U/L (ref 5–40)
ANION GAP SERPL CALC-SCNC: 14 MMOL/L
AST SERPL W P-5'-P-CCNC: 25 U/L (ref 10–37)
BILIRUB SERPL-MCNC: 0.6 MG/DL (ref 0.2–1.2)
BUN SERPL-MCNC: 15 MG/DL (ref 6–22)
CALCIUM SERPL-MCNC: 9.4 MG/DL (ref 8.4–10.5)
CHLORIDE SERPL-SCNC: 103 MMOL/L (ref 98–110)
CHOLEST SERPL-MCNC: 209 MG/DL (ref 110–200)
CO2 SERPL-SCNC: 25 MMOL/L (ref 20–32)
CREAT SERPL-MCNC: 0.7 MG/DL (ref 0.5–1.2)
GFRAA, 66117: >60
GFRNA, 66118: >60
GLOBULIN,GLOB: 2.8 G/DL (ref 2–4)
GLUCOSE SERPL-MCNC: 96 MG/DL (ref 70–99)
HDLC SERPL-MCNC: 2.8 MG/DL (ref 0–5)
HDLC SERPL-MCNC: 74 MG/DL (ref 40–59)
LDLC SERPL CALC-MCNC: 117 MG/DL (ref 50–99)
POTASSIUM SERPL-SCNC: 4.4 MMOL/L (ref 3.5–5.5)
PROT SERPL-MCNC: 7.4 G/DL (ref 6.4–8.3)
SODIUM SERPL-SCNC: 142 MMOL/L (ref 133–145)
TRIGL SERPL-MCNC: 91 MG/DL (ref 40–149)
VIT B12 SERPL-MCNC: >2000 PG/ML (ref 211–911)
VLDLC SERPL CALC-MCNC: 18 MG/DL (ref 8–30)

## 2019-10-10 ENCOUNTER — DOCUMENTATION ONLY (OUTPATIENT)
Dept: FAMILY MEDICINE CLINIC | Age: 59
End: 2019-10-10

## 2019-10-11 NOTE — PROGRESS NOTES
Vitamin B12 is above the recommended level, patient should to stop vitamin B12 for about 1 month and then she can resume taking it every other day.       Vitamin D is within normal limit patient should take 2000 units daily if she is not taking any vitamin D currently      Good cholesterol is elevated , and she had a slight increase in bad cholesterol otherwise labs are within normal limits

## 2019-10-14 ENCOUNTER — TELEPHONE (OUTPATIENT)
Dept: FAMILY MEDICINE CLINIC | Age: 59
End: 2019-10-14

## 2019-10-14 NOTE — TELEPHONE ENCOUNTER
Patient contacted with results per PCP, verified 2 patient identifiers. She has no further questions or concerns at this time.

## 2019-10-14 NOTE — TELEPHONE ENCOUNTER
----- Message from Sahil Sanabria MD sent at 10/11/2019  4:53 PM EDT -----  Vitamin B12 is above the recommended level, patient should to stop vitamin B12 for about 1 month and then she can resume taking it every other day.       Vitamin D is within normal limit patient should take 2000 units daily if she is not taking any vitamin D currently      Good cholesterol is elevated , and she had a slight increase in bad cholesterol otherwise labs are within normal limits

## 2019-10-16 DIAGNOSIS — F32.A DEPRESSIVE DISORDER: ICD-10-CM

## 2019-10-17 RX ORDER — SERTRALINE HYDROCHLORIDE 50 MG/1
TABLET, FILM COATED ORAL
Qty: 180 TAB | Refills: 0 | Status: SHIPPED | OUTPATIENT
Start: 2019-10-17 | End: 2020-01-16

## 2019-10-28 ENCOUNTER — HOSPITAL ENCOUNTER (OUTPATIENT)
Dept: NUTRITION | Age: 59
Discharge: HOME | End: 2019-10-28
Payer: COMMERCIAL

## 2019-10-28 PROCEDURE — 97802 MEDICAL NUTRITION INDIV IN: CPT

## 2019-10-28 NOTE — PROGRESS NOTES
[address] [place] [address] [address] [facility]  Phone: [phone] Fax: [phone]   Nutrition Assessment - Medical Nutrition Therapy   Outpatient Initial Evaluation         Patient Name: Kimberly Kang : 1960   Treatment Diagnosis: obesity   Referral Source: [facility]): 10/28/2019     Gender: female Age: 63 y.o. Ht: 67 In Wt: 245.4 lb  kg   BMI: 38.4 BMR   Male  BMR Female 1725     Past Medical History:  HLD     Pertinent Medications:        Biochemical Data:   No results found for: HBA1C, HGBE8, LNY5PSAB, GTO2WLXG  Lab Results   Component Value Date/Time    Sodium 142 10/08/2019 09:23 AM    Potassium 4.4 10/08/2019 09:23 AM    Chloride 103 10/08/2019 09:23 AM    CO2 25 10/08/2019 09:23 AM    Anion gap 14.0 10/08/2019 09:23 AM    Glucose 96 10/08/2019 09:23 AM    BUN 15 10/08/2019 09:23 AM    Creatinine 0.7 10/08/2019 09:23 AM    BUN/Creatinine ratio 16 2018 09:11 AM    GFR est AA >60 2018 09:11 AM    GFR est non-AA >60 2018 09:11 AM    Calcium 9.4 10/08/2019 09:23 AM    Bilirubin, total 0.6 10/08/2019 09:23 AM    AST (SGOT) 25 10/08/2019 09:23 AM    Alk. phosphatase 86 10/08/2019 09:23 AM    Protein, total 7.4 10/08/2019 09:23 AM    Albumin 4.6 10/08/2019 09:23 AM    Globulin 2.8 10/08/2019 09:23 AM    A-G Ratio 1.6 10/08/2019 09:23 AM    ALT (SGPT) 28 10/08/2019 09:23 AM     Lab Results   Component Value Date/Time    Cholesterol, total 209 (H) 10/08/2019 09:23 AM    HDL Cholesterol 74 (H) 10/08/2019 09:23 AM    LDL, calculated 117 (H) 10/08/2019 09:23 AM    VLDL, calculated 18 10/08/2019 09:23 AM    Triglyceride 91 10/08/2019 09:23 AM    CHOL/HDL Ratio 3.0 2018 09:11 AM     Lab Results   Component Value Date/Time    ALT (SGPT) 28 10/08/2019 09:23 AM    AST (SGOT) 25 10/08/2019 09:23 AM    Alk.  phosphatase 86 10/08/2019 09:23 AM    Bilirubin, total 0.6 10/08/2019 09:23 AM     Lab Results Component Value Date/Time    Creatinine 0.7 10/08/2019 09:23 AM     Lab Results   Component Value Date/Time    BUN 15 10/08/2019 09:23 AM     No results found for: MCACR, MCA1, MCA2, MCA3, MCAU, MCAU2, MCALPOCT     Assessment:    Pt stated she has struggled with weight since having children. She has successfully lost weight over the past 20 years with various diets, most recently following the whole 30-vegan diet. Pt regained the 17 lbs within 2 months of quitting the diet. Pt lives with her  and 2 daughters and 1 son in law. Her daughter is in charge of meal planning and preparation. Pt is not currently exercising, but expressed interest in starting to walk. Pt agreed to all goals and recommendations discussed today. Food & Nutrition: B- cereal or eggs + batista  L- salad + meat or leftovers or sandwich  D- meat + starch + veg  Sn- fruit, nuts, cookies    Pt drinks wine with a friend 1 d/wk. Her diet is inconsistent in both timing and macronutrient content. Her intake is high in carbohydrate and low in protein. Estimate Needs   Calories: 1300  Protein: 98 Carbs: 130 Fat: 43   Kcal/day  g/day  g/day  g/day        percent: 30  40  30               Nutrition Diagnosis Nutrition Diagnosis: Obesity related to increased PO intake and lack of activity as evidenced by high BMI and unhealthy diet and exercise recall. Nutrition Intervention &  Education: Provided macronutrient education, including optimal times to eat throughout the day and appropriate balance of macronutrients to promote more efficient RMR. Discussed the importance of developing a consistent lifestyle, including eating habits for long term weight loss and management. Provided pt with meal builder and diet plan. Encouraged pt to take more ownership of her own intake including grocery shopping and meal planning/preparation.    Handouts Provided: []  Carbohydrates  []  Protein  []  Non-starchy Vegatbles  []  Food Label  [] Meal and Snack Ideas  []  Food Journals []  Diabetes  []  Cholesterol  []  Sodium  []  Gen Nutr Guidelines  []  SBGM Guidelines  [x]  Others: meal builder and 1300 nick meal plan   Information Reviewed with: pt   Readiness to Change Stage: []  Pre-contemplative    [x]  Contemplative  []  Preparation               []  Action                  []  Maintenance   Potential Barriers to Learning: []  Decline in memory    []  Language barrier   []  Other:  []  Emotional                  []  Limited mobility  []  Lack of motivation     [] Vision, hearing or cognitive impairment   Expected Compliance:  Fair /     Nutritional Goal - To promote lifestyle changes to result in:    [x]  Weight loss  []  Improved diabetic control  []  Decreased cholesterol levels  []  Decreased blood pressure  []  Weight maintenance []  Preventing any interactions associated with food allergies  []  Adequate weight gain toward goal weight  []  Other:        Patient Goals:   1. Always combine protein and carbohydrate  2.  Eat 2-3 hrs after snacks and 4-5 hrs after meals     Dietitian Signature: Elaine Chowdhury MS, RD Date: 10/28/2019   Follow-up: 11/18 at 12:30 Time: 2:17 PM

## 2019-11-18 ENCOUNTER — HOSPITAL ENCOUNTER (OUTPATIENT)
Dept: NUTRITION | Age: 59
Discharge: HOME | End: 2019-11-18
Payer: COMMERCIAL

## 2019-11-18 PROCEDURE — 97803 MED NUTRITION INDIV SUBSEQ: CPT

## 2019-11-18 NOTE — PROGRESS NOTES
NUTRITION - FOLLOW-UP TREATMENT NOTE  Patient Name: Drew Alva         Date: 2019  : 1960    YES/NO Patient  Verified  Diagnosis: obesity   In time:   1200             Out time:   1230   Total Treatment Time (min):   30     SUBJECTIVE/ASSESSMENT    Current Wt: 243.8 Previous Wt: 245. 4 Wt Change: -1.6     Initial Wt:  Total Wt change:        Changes in medication or medical history? Any new allergies, surgeries or procedures? YES/NO    If yes, update Summary List   Pt encouraged by her progress, considering she felt like she has not made any significant changes in her eating habits. Pt reported her family is not supportive of her new meal plan and food choices. She struggles to eat snacks at the scheduled times. Pt has improved her carb to protein ratio but is not eating the full recommended servings of either. Nutrition Diagnosis        Diagnosis Status: Nutrition Diagnosis: Obesity related to increased PO intake and lack of activity as evidenced by high BMI and unhealthy diet and exercise recall. [x]  Improved []  No Change    []  Declined          Nutrition Prescription and  Intervention Calories: 1300  Protein: 98 Carbs: 130 Fat: 43   Kcal/day  g/day  g/day  g/day            percent: 30   40   30            Patient Education:  [x]  Review current plan with patient   []  Other:    Handouts/  Information Provided: []  Carbohydrates  []  Protein  []  Fiber  []  Serving Sizes  []  Fluids  []  General guidelines []  Diabetes  []  Cholesterol  []  Sodium  []  SBGM  []  Food Journals  [x]  Others: Meal plan with specific food and serving recommendations       Patient Goals 1. Eat full budget of carb at each scheduled time  2.  Eat minimum 4 oz and up to 6oz lean protein at lunch and dinner     PLAN    [x]  Continue on current plan []  Follow-up PRN   []  Discharge due to :    [x]  Next appt:  at 12:30     Dietitian: Elaine Chowdhury MS, RD    Date: 2019 Time: 1:59 PM

## 2020-01-15 ENCOUNTER — OFFICE VISIT (OUTPATIENT)
Dept: FAMILY MEDICINE CLINIC | Age: 60
End: 2020-01-15

## 2020-01-15 VITALS
HEART RATE: 85 BPM | SYSTOLIC BLOOD PRESSURE: 125 MMHG | HEIGHT: 65 IN | DIASTOLIC BLOOD PRESSURE: 79 MMHG | OXYGEN SATURATION: 99 % | WEIGHT: 248.2 LBS | BODY MASS INDEX: 41.35 KG/M2 | RESPIRATION RATE: 18 BRPM | TEMPERATURE: 98.4 F

## 2020-01-15 DIAGNOSIS — G89.29 CHRONIC PAIN OF LEFT KNEE: Primary | ICD-10-CM

## 2020-01-15 DIAGNOSIS — E66.01 OBESITY, MORBID (HCC): ICD-10-CM

## 2020-01-15 DIAGNOSIS — M25.562 CHRONIC PAIN OF LEFT KNEE: Primary | ICD-10-CM

## 2020-01-15 DIAGNOSIS — F32.A DEPRESSIVE DISORDER: ICD-10-CM

## 2020-01-15 RX ORDER — CHOLECALCIFEROL (VITAMIN D3) 125 MCG
2000 CAPSULE ORAL DAILY
COMMUNITY

## 2020-01-15 NOTE — PROGRESS NOTES
Dian Ceja     Chief Complaint   Patient presents with    Knee Pain     left knee x \"months\", no injury     Vitals:    01/15/20 1506   BP: 125/79   Pulse: 85   Resp: 18   Temp: 98.4 °F (36.9 °C)   TempSrc: Oral   SpO2: 99%   Weight: 248 lb 3.2 oz (112.6 kg)   Height: 5' 5\" (1.651 m)   PainSc:   8   PainLoc: Knee   LMP: 2012         HP:Nathaniel Grant is here for evaluation for worsening knee pain she did have a chronic knee pain had x-rays done in the past, but now the pain is constant is mainly when she change position from sitting to standing in the first few steps she feels the pain and then the pain gets better, she did take Advil and ibuprofen for pain. No numbness no weakness, no swelling or redness          Past Medical History:   Diagnosis Date    Acute pain of right ear 2015    Depressive disorder, not elsewhere classified     Gynecologic exam normal     Dr. Tammie Melendrez H/O bone density study 2013    normal    Hip pain 2014    Osteophyte     bilat knees    Sleep apnea     TMJ (temporomandibular joint disorder)      Past Surgical History:   Procedure Laterality Date    HC MAMMO DIAG BILAT      11/10    HX CYSTECTOMY      L shoulder    HX ORTHOPAEDIC      lipoma removed     Social History     Tobacco Use    Smoking status: Never Smoker    Smokeless tobacco: Never Used   Substance Use Topics    Alcohol use: Yes     Comment: occasionally       Family History   Problem Relation Age of Onset    Thyroid Disease Mother     Pneumonia Mother     Dementia Father     Stroke Father     Diabetes Sister     Elevated Lipids Sister     Hypertension Sister     No Known Problems Brother     No Known Problems Brother     Heart Disease Maternal Grandmother             Heart Disease Maternal Aunt        Review of Systems   Constitutional: Negative for chills, fever, malaise/fatigue and weight loss.    HENT: Negative for congestion, ear discharge, ear pain, hearing loss and nosebleeds. Eyes: Negative for blurred vision, double vision and discharge. Respiratory: Negative for cough. Cardiovascular: Negative for chest pain, palpitations, claudication and leg swelling. Gastrointestinal: Negative for abdominal pain, constipation, diarrhea, nausea and vomiting. Genitourinary: Negative for dysuria, frequency and urgency. Musculoskeletal: Positive for joint pain. Negative for myalgias. Skin: Negative for itching and rash. Neurological: Negative for dizziness, tingling, sensory change, speech change, focal weakness, weakness and headaches. Psychiatric/Behavioral: Negative for depression, hallucinations, substance abuse and suicidal ideas. The patient is not nervous/anxious. Physical Exam  Vitals signs and nursing note reviewed. Constitutional:       General: She is not in acute distress. Appearance: She is well-developed. She is not diaphoretic. HENT:      Head: Normocephalic and atraumatic. Mouth/Throat:      Pharynx: No oropharyngeal exudate. Eyes:      General: No scleral icterus. Right eye: No discharge. Left eye: No discharge. Conjunctiva/sclera: Conjunctivae normal.      Pupils: Pupils are equal, round, and reactive to light. Neck:      Thyroid: No thyromegaly. Cardiovascular:      Rate and Rhythm: Normal rate and regular rhythm. Heart sounds: Normal heart sounds. No murmur. Pulmonary:      Effort: Pulmonary effort is normal. No respiratory distress. Breath sounds: Normal breath sounds. No wheezing or rales. Chest:      Chest wall: No tenderness. Abdominal:      General: There is no distension. Palpations: Abdomen is soft. Tenderness: There is no tenderness. There is no rebound. Musculoskeletal: Normal range of motion. General: No tenderness or deformity. Comments: There is no tenderness on palpation of both knees, no effusion no swelling and no erythema.   There is bilateral crepitus       Lymphadenopathy:      Cervical: No cervical adenopathy. Skin:     General: Skin is warm and dry. Coloration: Skin is not pale. Findings: No erythema or rash. Neurological:      Mental Status: She is alert and oriented to person, place, and time. Cranial Nerves: No cranial nerve deficit. Coordination: Coordination normal.   Psychiatric:         Behavior: Behavior normal.         Thought Content: Thought content normal.         Judgment: Judgment normal.          Assessment and plan     Plan of care has been discussed with the patient, he agrees to the plan and verbalized understanding. All his questions were answered  More than 50% of the time spent in this visit was counseling the patient about  illness and treatment options         1. Chronic pain of left knee  Get x-ray of the left knee    Advised patient to take Tylenol instead of Advil and ibuprofen because of their effect on kidney function    Patient had x-ray done in 2014 that showed some patellofemoral osteophytes    2. Obesity, morbid ([address]  Patient was referred to dietitian but still she had not lost weight she had gained few pounds since last visit    I strongly recommend the patient to focus on weight loss but if that will help her knee    3. Depressive disorder  Stable not suicidal not homicidal    Current Outpatient Medications   Medication Sig Dispense Refill    cholecalciferol, vitamin D3, (VITAMIN D3) 2,000 unit tab Take 2,000 Units by mouth daily.  sertraline (ZOLOFT) 50 mg tablet TAKE 2 TABLETS BY MOUTH ONCE DAILY 180 Tab 0    calcium carbonate (CALCIUM 600 PO) Take 2 Caps by mouth daily.  ASCORBATE CALCIUM (VITAMIN C PO) Take 1,000 mg by mouth daily.  cpap machine kit by Does Not Apply route. Change to auto-CPAP at 5-8 cm with humidifier. Mask of choice. Length of need 99 months. Replace mask and accessories as needed x 12 months. Download data at 30 days and fax at [phone].  Dx- GARCÍA, excessive sleepiness, obesity. DME- [facility] 1 Kit 0    Glucosamine HCl 500 mg Tab Take 1 tablet by mouth daily.  MULTIVITAMIN PO Take 1 tablet by mouth daily.  cyanocobalamin 1,000 mcg tablet Take 1,000 mcg by mouth daily.  VITAMIN B COMPLEX (B COMPLEX PO) Take 1 tablet by mouth daily.  fish oil-dha-epa (FISH OIL) 1,200-144-216 mg Cap Take 1 capsule by mouth daily. Patient Active Problem List    Diagnosis Date Noted    Sleep apnea 07/09/2019    Obesity, morbid ([address] 08/20/2018    Heel spur 11/17/2014    Vaginal dryness 08/09/2014    TMJ (temporomandibular joint disorder)     Depressive disorder      Results for orders placed or performed in visit on 10/08/19   VITAMIN D, 25 HYDROXY   Result Value Ref Range    VITAMIN D, 25-HYDROXY 44.4 32.0 - 100.0 ng/mL   VITAMIN B12   Result Value Ref Range    Vitamin B12 >2000 (H) 211 - 781 pg/mL   METABOLIC PANEL, COMPREHENSIVE   Result Value Ref Range    Glucose 96 70 - 99 mg/dL    BUN 15 6 - 22 mg/dL    Creatinine 0.7 0.5 - 1.2 mg/dL    Sodium 142 133 - 145 mmol/L    Potassium 4.4 3.5 - 5.5 mmol/L    Chloride 103 98 - 110 mmol/L    CO2 25 20 - 32 mmol/L    AST (SGOT) 25 10 - 37 U/L    ALT (SGPT) 28 5 - 40 U/L    Alk.  phosphatase 86 25 - 115 U/L    Bilirubin, total 0.6 0.2 - 1.2 mg/dL    Calcium 9.4 8.4 - 10.5 mg/dL    Protein, total 7.4 6.4 - 8.3 g/dL    Albumin 4.6 3.5 - 5.0 g/dL    A-G Ratio 1.6 1.1 - 2.6 ratio    Globulin 2.8 2.0 - 4.0 g/dL    Anion gap 14.0 mmol/L    GFRAA >60.0 >60.0    GFRNA >60.0 >60.0   LIPID PANEL   Result Value Ref Range    Triglyceride 91 40 - 149 mg/dL    HDL Cholesterol 74 (H) 40 - 59 mg/dL    Cholesterol, total 209 (H) 110 - 200 mg/dL    CHOLESTEROL/HDL 2.8 0.0 - 5.0    LDL, calculated 117 (H) 50 - 99 mg/dL    VLDL, calculated 18 8 - 30 mg/dL   CBC WITH AUTOMATED DIFF   Result Value Ref Range    WBC 6.4 4.0 - 11.0 K/uL    RBC 4.41 3.80 - 5.20 M/uL    HGB 14.4 11.7 - 16.0 g/dL    HCT 44.0 35.1 - 48.0 %     (H) 80 - 95 fL    MCH 33 26 - 34 pg    MCHC 33 31 - 36 g/dL    RDW 13.7 10.0 - 15.5 %    PLATELET 816 135 - 317 K/uL    MPV 11.3 9.0 - 13.0 fL    NEUTROPHILS 54 40 - 75 %    Lymphocytes 35 20 - 45 %    MONOCYTES 6 3 - 12 %    EOSINOPHILS 4 0 - 6 %    BASOPHILS 1 0 - 2 %    ABS. NEUTROPHILS 3.5 1.8 - 7.7 K/uL    ABSOLUTE LYMPHOCYTE COUNT 2.2 1.0 - 4.8 K/uL    ABS. MONOCYTES 0.4 0.1 - 1.0 K/uL    ABS. EOSINOPHILS 0.2 0.0 - 0.5 K/uL    ABS. BASOPHILS 0.1 0.0 - 0.2 K/uL     No visits with results within 3 Month(s) from this visit. Latest known visit with results is:   Office Visit on 10/08/2019   Component Date Value Ref Range Status    VITAMIN D, 25-HYDROXY 10/08/2019 44.4  32.0 - 100.0 ng/mL Final    Vitamin B12 10/08/2019 >2000* 211 - 911 pg/mL Final    Glucose 10/08/2019 96  70 - 99 mg/dL Final    BUN 10/08/2019 15  6 - 22 mg/dL Final    Creatinine 10/08/2019 0.7  0.5 - 1.2 mg/dL Final    Sodium 10/08/2019 142  133 - 145 mmol/L Final    Potassium 10/08/2019 4.4  3.5 - 5.5 mmol/L Final    Chloride 10/08/2019 103  98 - 110 mmol/L Final    CO2 10/08/2019 25  20 - 32 mmol/L Final    AST (SGOT) 10/08/2019 25  10 - 37 U/L Final    ALT (SGPT) 10/08/2019 28  5 - 40 U/L Final    Alk. phosphatase 10/08/2019 86  25 - 115 U/L Final    Bilirubin, total 10/08/2019 0.6  0.2 - 1.2 mg/dL Final    Calcium 10/08/2019 9.4  8.4 - 10.5 mg/dL Final    Protein, total 10/08/2019 7.4  6.4 - 8.3 g/dL Final    Albumin 10/08/2019 4.6  3.5 - 5.0 g/dL Final    A-G Ratio 10/08/2019 1.6  1.1 - 2.6 ratio Final    Globulin 10/08/2019 2.8  2.0 - 4.0 g/dL Final    Anion gap 10/08/2019 14.0  mmol/L Final    Comment: Estimated GFR results are reported in mL/min/1.73 sq.m. by the MDRD equation. This eGFR is validated for stable chronic renal failure patients. This   equation  is unreliable in acute illness or patients with normal renal function.       GFRAA 10/08/2019 >60.0  >60.0 Final    GFRNA 10/08/2019 >60.0  >60.0 Final    Triglyceride 10/08/2019 91  40 - 149 mg/dL Final    HDL Cholesterol 10/08/2019 74* 40 - 59 mg/dL Final    Cholesterol, total 10/08/2019 209* 110 - 200 mg/dL Final    CHOLESTEROL/HDL 10/08/2019 2.8  0.0 - 5.0 Final    LDL, calculated 10/08/2019 117* 50 - 99 mg/dL Final    VLDL, calculated 10/08/2019 18  8 - 30 mg/dL Final    Comment: Cholesterol Recommended NCEP guidelines in mg/dL:  Less than 200            Desirable  200 - 239                Borderline High  Greater than or  = 240   High  Please Note:  Total Chol/HDL Ratio                   Men     Women  1/2 Avg. Risk    3.4     3.3      Avg. Risk    5.0     4.4  2X  Avg. Risk    9.6     7.1  3X  Avg. Risk   23.4    11.0      WBC 10/08/2019 6.4  4.0 - 11.0 K/uL Final    RBC 10/08/2019 4.41  3.80 - 5.20 M/uL Final    HGB 10/08/2019 14.4  11.7 - 16.0 g/dL Final    HCT 10/08/2019 44.0  35.1 - 48.0 % Final    MCV 10/08/2019 100* 80 - 95 fL Final    MCH 10/08/2019 33  26 - 34 pg Final    MCHC 10/08/2019 33  31 - 36 g/dL Final    RDW 10/08/2019 13.7  10.0 - 15.5 % Final    PLATELET 92/36/7003 585  140 - 440 K/uL Final    MPV 10/08/2019 11.3  9.0 - 13.0 fL Final    NEUTROPHILS 10/08/2019 54  40 - 75 % Final    Lymphocytes 10/08/2019 35  20 - 45 % Final    MONOCYTES 10/08/2019 6  3 - 12 % Final    EOSINOPHILS 10/08/2019 4  0 - 6 % Final    BASOPHILS 10/08/2019 1  0 - 2 % Final    ABS. NEUTROPHILS 10/08/2019 3.5  1.8 - 7.7 K/uL Final    ABSOLUTE LYMPHOCYTE COUNT 10/08/2019 2.2  1.0 - 4.8 K/uL Final    ABS. MONOCYTES 10/08/2019 0.4  0.1 - 1.0 K/uL Final    ABS. EOSINOPHILS 10/08/2019 0.2  0.0 - 0.5 K/uL Final    ABS. BASOPHILS 10/08/2019 0.1  0.0 - 0.2 K/uL Final          Follow-up and Dispositions    · Return if symptoms worsen or fail to improve.

## 2020-01-15 NOTE — PROGRESS NOTES
Records in this patient's chart that were scanned in on 12- are not for this patient. Heat ticket created to have these records removed and placed in correct patient's chart.

## 2020-01-15 NOTE — PROGRESS NOTES
Zaheer Herron presents today for   Chief Complaint   Patient presents with    Knee Pain     left knee x \"months\", no injury       Is someone accompanying this pt? no    Is the patient using any DME equipment during OV? no    Depression Screening:  3 most recent PHQ Screens 10/8/2019   Little interest or pleasure in doing things Not at all   Feeling down, depressed, irritable, or hopeless Not at all   Total Score PHQ 2 0       Learning Assessment:  Learning Assessment 10/8/2019   PRIMARY LEARNER Patient   HIGHEST LEVEL OF EDUCATION - PRIMARY LEARNER  -   BARRIERS PRIMARY LEARNER -   [facility]    NEED -   LEARNER PREFERENCE PRIMARY READING     DEMONSTRATION   ANSWERED BY patient   RELATIONSHIP SELF       Abuse Screening:  Abuse Screening Questionnaire 10/8/2019   Do you ever feel afraid of your partner? N   Are you in a relationship with someone who physically or mentally threatens you? N   Is it safe for you to go home? Y       Fall Risk  Fall Risk Assessment, last 12 mths 10/8/2019   Able to walk? Yes   Fall in past 12 months? No       Health Maintenance reviewed and discussed and ordered per Provider. Health Maintenance Due   Topic Date Due    Hepatitis C Screening  1960    BREAST CANCER SCRN MAMMOGRAM  11/05/2015    PAP AKA CERVICAL CYTOLOGY  02/09/2019     Patient states she had mammogram in the summer of 2019. She states her last pap was approximately 3 years ago. She is due this year and follows with gynecology. We requested mammogram and pap at last visit from Dr. Heidi Puga. I will print GUY and send again. Pt currently taking Antiplatelet therapy? no    Coordination of Care:  1. Have you been to the ER, urgent care clinic since your last visit? Hospitalized since your last visit? no    2. Have you seen or consulted any other health care providers outside of the [address] [place] [address] since your last visit?  Include any pap smears or colon screening.  no

## 2020-01-16 DIAGNOSIS — F32.A DEPRESSIVE DISORDER: ICD-10-CM

## 2020-01-16 RX ORDER — SERTRALINE HYDROCHLORIDE 50 MG/1
TABLET, FILM COATED ORAL
Qty: 180 TAB | Refills: 0 | Status: SHIPPED | OUTPATIENT
Start: 2020-01-16 | End: 2020-05-08

## 2020-01-19 DIAGNOSIS — M17.12 OSTEOARTHRITIS OF LEFT KNEE, UNSPECIFIED OSTEOARTHRITIS TYPE: ICD-10-CM

## 2020-01-19 DIAGNOSIS — M25.562 CHRONIC PAIN OF LEFT KNEE: Primary | ICD-10-CM

## 2020-01-19 DIAGNOSIS — G89.29 CHRONIC PAIN OF LEFT KNEE: Primary | ICD-10-CM

## 2020-01-20 ENCOUNTER — TELEPHONE (OUTPATIENT)
Dept: FAMILY MEDICINE CLINIC | Age: 60
End: 2020-01-20

## 2020-05-08 DIAGNOSIS — F32.A DEPRESSIVE DISORDER: ICD-10-CM

## 2020-05-08 RX ORDER — SERTRALINE HYDROCHLORIDE 50 MG/1
TABLET, FILM COATED ORAL
Qty: 180 TAB | Refills: 0 | Status: SHIPPED | OUTPATIENT
Start: 2020-05-08 | End: 2020-09-25

## 2020-08-10 ENCOUNTER — TELEPHONE (OUTPATIENT)
Dept: FAMILY MEDICINE CLINIC | Age: 60
End: 2020-08-10

## 2020-08-10 ENCOUNTER — OFFICE VISIT (OUTPATIENT)
Dept: FAMILY MEDICINE CLINIC | Age: 60
End: 2020-08-10

## 2020-08-10 VITALS
DIASTOLIC BLOOD PRESSURE: 81 MMHG | RESPIRATION RATE: 16 BRPM | TEMPERATURE: 97.5 F | HEIGHT: 65 IN | WEIGHT: 246 LBS | OXYGEN SATURATION: 98 % | HEART RATE: 80 BPM | SYSTOLIC BLOOD PRESSURE: 119 MMHG | BODY MASS INDEX: 40.98 KG/M2

## 2020-08-10 DIAGNOSIS — E55.9 VITAMIN D DEFICIENCY: ICD-10-CM

## 2020-08-10 DIAGNOSIS — N39.46 MIXED INCONTINENCE: Primary | ICD-10-CM

## 2020-08-10 DIAGNOSIS — F32.A DEPRESSIVE DISORDER: ICD-10-CM

## 2020-08-10 DIAGNOSIS — M25.562 CHRONIC PAIN OF LEFT KNEE: ICD-10-CM

## 2020-08-10 DIAGNOSIS — G89.29 CHRONIC PAIN OF LEFT KNEE: ICD-10-CM

## 2020-08-10 DIAGNOSIS — E66.01 OBESITY, MORBID (HCC): ICD-10-CM

## 2020-08-10 RX ORDER — OXYBUTYNIN CHLORIDE 10 MG/1
10 TABLET, EXTENDED RELEASE ORAL DAILY
Qty: 90 TAB | Refills: 0 | Status: SHIPPED | OUTPATIENT
Start: 2020-08-10 | End: 2021-02-26

## 2020-08-10 NOTE — PROGRESS NOTES
Reggie Hilton is a 62 y.o. female (: 1960) presenting to address:    Chief Complaint   Patient presents with    Medication Management     follow up       Vitals:    08/10/20 0809   BP: 119/81   Pulse: 80   Resp: 16   Temp: 97.5 °F (36.4 °C)   TempSrc: Oral   SpO2: 98%   Weight: 246 lb (111.6 kg)   Height: 5' 5\" (1.651 m)   PainSc:   0 - No pain   LMP: 2012       Hearing/Vision:   No exam data present    Learning Assessment:     Learning Assessment 10/8/2019   PRIMARY LEARNER Patient   HIGHEST LEVEL OF EDUCATION - PRIMARY LEARNER  -   BARRIERS PRIMARY LEARNER -   CO-LEARNER CAREGIVER -   PRIMARY LANGUAGE ENGLISH    NEED -   LEARNER PREFERENCE PRIMARY READING     DEMONSTRATION   ANSWERED BY patient   RELATIONSHIP SELF     Depression Screening:     3 most recent PHQ Screens 8/10/2020   Little interest or pleasure in doing things Not at all   Feeling down, depressed, irritable, or hopeless Not at all   Total Score PHQ 2 0     Fall Risk Assessment:     Fall Risk Assessment, last 12 mths 8/10/2020   Able to walk? Yes   Fall in past 12 months? Yes   Number of falls in past 12 months 1     Abuse Screening:     Abuse Screening Questionnaire 8/10/2020   Do you ever feel afraid of your partner? N   Are you in a relationship with someone who physically or mentally threatens you? N   Is it safe for you to go home? Y     Coordination of Care Questionaire:   1. Have you been to the ER, urgent care clinic since your last visit? Hospitalized since your last visit? NO    2. Have you seen or consulted any other health care providers outside of the [address] [place] [address] since your last visit? Include any pap smears or colon screening. YES, Orthopaedic     Advanced Directive:   1. Do you have an Advanced Directive? NO    2. Would you like information on Advanced Directives?  NO

## 2020-08-10 NOTE — PROGRESS NOTES
Jovon Brownlee     Chief Complaint   Patient presents with    Medication Management     follow up     Vitals:    08/10/20 0809   BP: 119/81   Pulse: 80   Resp: 16   Temp: 97.5 °F (36.4 °C)   TempSrc: Oral   SpO2: 98%   Weight: 246 lb (111.6 kg)   Height: 5' 5\" (1.651 m)   PainSc:   0 - No pain   LMP: 04/08/2012         HPI:she is here for follow up and has left knee pain she had seen orthopedics few months ago and received injection, pain was relieved for few months but now she is having increased pain. Patient need to be adherent to lifestyle modification and weight loss, and she need to call  Ortho for follow up and possible anther injection . Patient has been taking Zoloft 75 mg daily for depression    She feels medication is suppressing her feelings and she would like to consider changing Zoloft, she has been on Zoloft for many years according to my records since 2012    She was taking Celexa before that and she is not sure why it was stopped or changed. Advised patient to decrease dose to take 75 alternating with 50 mg every other day, and to see if that dose will be enough to relieve her depression but still having normal emotions, she can decrease further after couple of weeks to months 50 mg daily if needed. Patient also is complaining about urgency of urination and she has mixed incontinence urge and stress and sometimes she have no control, and have accidents on her way to the bathroom. She had tried Jackie exercise  But never had physical therapy training patient will get Pap smear with OB/GYN next month, so in pelvic exam GYN can evaluate for prolapse of the pelvic floor or bladder advised patient to continue Keagle exercises.     And to start medications oxybutynin daily      Past Medical History:   Diagnosis Date    Acute pain of right ear 5/14/2015    Depressive disorder, not elsewhere classified     Gynecologic exam normal     Dr. Neha Rogel H/O bone density study 9/2013 normal    Hip pain 2014    Osteophyte     bilat knees    Sleep apnea     TMJ (temporomandibular joint disorder)      Past Surgical History:   Procedure Laterality Date    HC MAMMO DIAG BILAT      11/10    HX CYSTECTOMY      L shoulder    HX ORTHOPAEDIC      lipoma removed     Social History     Tobacco Use    Smoking status: Never Smoker    Smokeless tobacco: Never Used   Substance Use Topics    Alcohol use: Yes     Comment: occasionally       Family History   Problem Relation Age of Onset    Thyroid Disease Mother     Pneumonia Mother     Dementia Father     Stroke Father     Diabetes Sister     Elevated Lipids Sister     Hypertension Sister     No Known Problems Brother     No Known Problems Brother     Heart Disease Maternal Grandmother             Heart Disease Maternal Aunt        Review of Systems   Constitutional: Negative for chills, fever, malaise/fatigue and weight loss. HENT: Negative for congestion, ear discharge, ear pain, hearing loss, nosebleeds, sinus pain and sore throat. Eyes: Negative for blurred vision, double vision and discharge. Respiratory: Negative for cough, hemoptysis, sputum production, shortness of breath and wheezing. Cardiovascular: Negative for chest pain, palpitations, claudication and leg swelling. Gastrointestinal: Negative for abdominal pain, constipation, diarrhea, nausea and vomiting. Genitourinary: Positive for urgency. Negative for dysuria, flank pain, frequency and hematuria. Musculoskeletal: Positive for joint pain. Negative for back pain, falls, myalgias and neck pain. Skin: Negative for itching and rash. Neurological: Negative for dizziness, tingling, sensory change, speech change, focal weakness, weakness and headaches. Psychiatric/Behavioral: Negative for depression, hallucinations, memory loss, substance abuse and suicidal ideas. The patient is not nervous/anxious and does not have insomnia.         Physical Exam  Constitutional:       General: She is not in acute distress. Appearance: She is well-developed. She is not diaphoretic. HENT:      Head: Normocephalic and atraumatic. Eyes:      General: No scleral icterus. Right eye: No discharge. Left eye: No discharge. Conjunctiva/sclera: Conjunctivae normal.      Pupils: Pupils are equal, round, and reactive to light. Neck:      Thyroid: No thyromegaly. Cardiovascular:      Rate and Rhythm: Normal rate and regular rhythm. Heart sounds: Normal heart sounds. Pulmonary:      Effort: Pulmonary effort is normal. No respiratory distress. Breath sounds: Normal breath sounds. No wheezing or rales. Chest:      Chest wall: No tenderness. Abdominal:      General: There is no distension. Palpations: Abdomen is soft. Tenderness: There is no abdominal tenderness. There is no rebound. Musculoskeletal: Normal range of motion. General: No tenderness or deformity. Lymphadenopathy:      Cervical: No cervical adenopathy. Skin:     General: Skin is warm and dry. Coloration: Skin is not pale. Findings: No erythema or rash. Neurological:      Mental Status: She is alert and oriented to person, place, and time. Cranial Nerves: No cranial nerve deficit. Coordination: Coordination normal.   Psychiatric:         Behavior: Behavior normal.         Thought Content: Thought content normal.         Judgment: Judgment normal.          Assessment and plan     Plan of care has been discussed with the patient, he agrees to the plan and verbalized understanding. All his questions were answered  More than 50% of the time spent in this visit was counseling the patient about  illness and treatment options         1. Mixed incontinence    - oxybutynin chloride XL (DITROPAN XL) 10 mg CR tablet; Take 1 Tab by mouth daily. Dispense: 90 Tab; Refill: 0    2. Depressive disorder  See above    3.  Chronic pain of left knee  See above     4. Obesity, morbid ([facility] [address]  Lifestyle modification has been discussed with patient in details, decrease carbohydrates, decrease or eliminate sugar intake, gradually increase physical activity as tolerated to be about 4 to 5 hours a week. 5. Vitamin D deficiency  Continue vitamin D supplements    Current Outpatient Medications   Medication Sig Dispense Refill    oxybutynin chloride XL (DITROPAN XL) 10 mg CR tablet Take 1 Tab by mouth daily. 90 Tab 0    sertraline (ZOLOFT) 50 mg tablet Take 2 tablets by mouth once daily (Patient taking differently: 1 1/2 tabs) 180 Tab 0    cholecalciferol, vitamin D3, (VITAMIN D3) 2,000 unit tab Take 2,000 Units by mouth daily.  calcium carbonate (CALCIUM 600 PO) Take 2 Caps by mouth daily.  cyanocobalamin 1,000 mcg tablet Take 1,000 mcg by mouth daily.  ASCORBATE CALCIUM (VITAMIN C PO) Take 1,000 mg by mouth daily.  VITAMIN B COMPLEX (B COMPLEX PO) Take 1 tablet by mouth daily.  Glucosamine HCl 500 mg Tab Take 1 tablet by mouth daily.  fish oil-dha-epa (FISH OIL) 1,200-144-216 mg Cap Take 1 capsule by mouth daily.  MULTIVITAMIN PO Take 1 tablet by mouth daily.  cpap machine kit by Does Not Apply route. Change to auto-CPAP at 5-8 cm with humidifier. Mask of choice. Length of need 99 months. Replace mask and accessories as needed x 12 months. Download data at 30 days and fax at [phone]. Dx- GARCÍA, excessive sleepiness, obesity.  DME- [facility] 1 Kit 0       Patient Active Problem List    Diagnosis Date Noted    Sleep apnea 07/09/2019    Obesity, morbid ([facility] 08/20/2018    Heel spur 11/17/2014    Vaginal dryness 08/09/2014    TMJ (temporomandibular joint disorder)     Depressive disorder      Results for orders placed or performed in visit on 10/08/19   VITAMIN D, 25 HYDROXY   Result Value Ref Range    VITAMIN D, 25-HYDROXY 44.4 32.0 - 100.0 ng/mL   VITAMIN B12   Result Value Ref Range    Vitamin B12 >2000 (H) 211 - 646 pg/mL   METABOLIC PANEL, COMPREHENSIVE   Result Value Ref Range    Glucose 96 70 - 99 mg/dL    BUN 15 6 - 22 mg/dL    Creatinine 0.7 0.5 - 1.2 mg/dL    Sodium 142 133 - 145 mmol/L    Potassium 4.4 3.5 - 5.5 mmol/L    Chloride 103 98 - 110 mmol/L    CO2 25 20 - 32 mmol/L    AST (SGOT) 25 10 - 37 U/L    ALT (SGPT) 28 5 - 40 U/L    Alk. phosphatase 86 25 - 115 U/L    Bilirubin, total 0.6 0.2 - 1.2 mg/dL    Calcium 9.4 8.4 - 10.5 mg/dL    Protein, total 7.4 6.4 - 8.3 g/dL    Albumin 4.6 3.5 - 5.0 g/dL    A-G Ratio 1.6 1.1 - 2.6 ratio    Globulin 2.8 2.0 - 4.0 g/dL    Anion gap 14.0 mmol/L    GFRAA >60.0 >60.0    GFRNA >60.0 >60.0   LIPID PANEL   Result Value Ref Range    Triglyceride 91 40 - 149 mg/dL    HDL Cholesterol 74 (H) 40 - 59 mg/dL    Cholesterol, total 209 (H) 110 - 200 mg/dL    CHOLESTEROL/HDL 2.8 0.0 - 5.0    LDL, calculated 117 (H) 50 - 99 mg/dL    VLDL, calculated 18 8 - 30 mg/dL   CBC WITH AUTOMATED DIFF   Result Value Ref Range    WBC 6.4 4.0 - 11.0 K/uL    RBC 4.41 3.80 - 5.20 M/uL    HGB 14.4 11.7 - 16.0 g/dL    HCT 44.0 35.1 - 48.0 %     (H) 80 - 95 fL    MCH 33 26 - 34 pg    MCHC 33 31 - 36 g/dL    RDW 13.7 10.0 - 15.5 %    PLATELET 640 201 - 543 K/uL    MPV 11.3 9.0 - 13.0 fL    NEUTROPHILS 54 40 - 75 %    Lymphocytes 35 20 - 45 %    MONOCYTES 6 3 - 12 %    EOSINOPHILS 4 0 - 6 %    BASOPHILS 1 0 - 2 %    ABS. NEUTROPHILS 3.5 1.8 - 7.7 K/uL    ABSOLUTE LYMPHOCYTE COUNT 2.2 1.0 - 4.8 K/uL    ABS. MONOCYTES 0.4 0.1 - 1.0 K/uL    ABS. EOSINOPHILS 0.2 0.0 - 0.5 K/uL    ABS. BASOPHILS 0.1 0.0 - 0.2 K/uL     No visits with results within 3 Month(s) from this visit.    Latest known visit with results is:   Office Visit on 10/08/2019   Component Date Value Ref Range Status    VITAMIN D, 25-HYDROXY 10/08/2019 44.4  32.0 - 100.0 ng/mL Final    Vitamin B12 10/08/2019 >2000* 211 - 911 pg/mL Final    Glucose 10/08/2019 96  70 - 99 mg/dL Final    BUN 10/08/2019 15  6 - 22 mg/dL Final    Creatinine 10/08/2019 0.7  0.5 - 1.2 mg/dL Final    Sodium 10/08/2019 142  133 - 145 mmol/L Final    Potassium 10/08/2019 4.4  3.5 - 5.5 mmol/L Final    Chloride 10/08/2019 103  98 - 110 mmol/L Final    CO2 10/08/2019 25  20 - 32 mmol/L Final    AST (SGOT) 10/08/2019 25  10 - 37 U/L Final    ALT (SGPT) 10/08/2019 28  5 - 40 U/L Final    Alk. phosphatase 10/08/2019 86  25 - 115 U/L Final    Bilirubin, total 10/08/2019 0.6  0.2 - 1.2 mg/dL Final    Calcium 10/08/2019 9.4  8.4 - 10.5 mg/dL Final    Protein, total 10/08/2019 7.4  6.4 - 8.3 g/dL Final    Albumin 10/08/2019 4.6  3.5 - 5.0 g/dL Final    A-G Ratio 10/08/2019 1.6  1.1 - 2.6 ratio Final    Globulin 10/08/2019 2.8  2.0 - 4.0 g/dL Final    Anion gap 10/08/2019 14.0  mmol/L Final    Comment: Estimated GFR results are reported in mL/min/1.73 sq.m. by the MDRD equation. This eGFR is validated for stable chronic renal failure patients. This   equation  is unreliable in acute illness or patients with normal renal function.  GFRAA 10/08/2019 >60.0  >60.0 Final    GFRNA 10/08/2019 >60.0  >60.0 Final    Triglyceride 10/08/2019 91  40 - 149 mg/dL Final    HDL Cholesterol 10/08/2019 74* 40 - 59 mg/dL Final    Cholesterol, total 10/08/2019 209* 110 - 200 mg/dL Final    CHOLESTEROL/HDL 10/08/2019 2.8  0.0 - 5.0 Final    LDL, calculated 10/08/2019 117* 50 - 99 mg/dL Final    VLDL, calculated 10/08/2019 18  8 - 30 mg/dL Final    Comment: Cholesterol Recommended NCEP guidelines in mg/dL:  Less than 200            Desirable  200 - 239                Borderline High  Greater than or  = 240   High  Please Note:  Total Chol/HDL Ratio                   Men     Women  1/2 Avg. Risk    3.4     3.3      Avg. Risk    5.0     4.4  2X  Avg. Risk    9.6     7.1  3X  Avg.  Risk   23.4    11.0      WBC 10/08/2019 6.4  4.0 - 11.0 K/uL Final    RBC 10/08/2019 4.41  3.80 - 5.20 M/uL Final    HGB 10/08/2019 14.4  11.7 - 16.0 g/dL Final    HCT 10/08/2019 44.0  35.1 - 48.0 % Final    MCV 10/08/2019 100* 80 - 95 fL Final    MCH 10/08/2019 33  26 - 34 pg Final    MCHC 10/08/2019 33  31 - 36 g/dL Final    RDW 10/08/2019 13.7  10.0 - 15.5 % Final    PLATELET 64/50/4079 370  140 - 440 K/uL Final    MPV 10/08/2019 11.3  9.0 - 13.0 fL Final    NEUTROPHILS 10/08/2019 54  40 - 75 % Final    Lymphocytes 10/08/2019 35  20 - 45 % Final    MONOCYTES 10/08/2019 6  3 - 12 % Final    EOSINOPHILS 10/08/2019 4  0 - 6 % Final    BASOPHILS 10/08/2019 1  0 - 2 % Final    ABS. NEUTROPHILS 10/08/2019 3.5  1.8 - 7.7 K/uL Final    ABSOLUTE LYMPHOCYTE COUNT 10/08/2019 2.2  1.0 - 4.8 K/uL Final    ABS. MONOCYTES 10/08/2019 0.4  0.1 - 1.0 K/uL Final    ABS. EOSINOPHILS 10/08/2019 0.2  0.0 - 0.5 K/uL Final    ABS.  BASOPHILS 10/08/2019 0.1  0.0 - 0.2 K/uL Final

## 2020-09-24 DIAGNOSIS — F32.A DEPRESSIVE DISORDER: ICD-10-CM

## 2020-09-25 RX ORDER — SERTRALINE HYDROCHLORIDE 50 MG/1
75 TABLET, FILM COATED ORAL DAILY
Qty: 135 TAB | Refills: 0 | Status: SHIPPED | OUTPATIENT
Start: 2020-09-25 | End: 2021-01-30

## 2021-01-29 DIAGNOSIS — F32.A DEPRESSIVE DISORDER: ICD-10-CM

## 2021-01-30 RX ORDER — SERTRALINE HYDROCHLORIDE 50 MG/1
TABLET, FILM COATED ORAL
Qty: 135 TAB | Refills: 0 | Status: SHIPPED | OUTPATIENT
Start: 2021-01-30 | End: 2021-07-19 | Stop reason: SDUPTHER

## 2021-02-02 NOTE — TELEPHONE ENCOUNTER
PT scheduled, please place labs.   Future Appointments   Date Time Provider Michael Alicia   2/19/2021  7:10 AM LAB[facility] AMB   2/26/2021  3:00 PM Dario Lockett MD [facility] AMB

## 2021-02-26 ENCOUNTER — OFFICE VISIT (OUTPATIENT)
Dept: FAMILY MEDICINE CLINIC | Age: 61
End: 2021-02-26
Payer: COMMERCIAL

## 2021-02-26 VITALS
HEIGHT: 65 IN | SYSTOLIC BLOOD PRESSURE: 124 MMHG | DIASTOLIC BLOOD PRESSURE: 85 MMHG | HEART RATE: 81 BPM | OXYGEN SATURATION: 99 % | WEIGHT: 242 LBS | RESPIRATION RATE: 16 BRPM | TEMPERATURE: 97.9 F | BODY MASS INDEX: 40.32 KG/M2

## 2021-02-26 DIAGNOSIS — E53.8 VITAMIN B 12 DEFICIENCY: ICD-10-CM

## 2021-02-26 DIAGNOSIS — N39.46 MIXED INCONTINENCE: ICD-10-CM

## 2021-02-26 DIAGNOSIS — G89.29 CHRONIC PAIN OF LEFT KNEE: ICD-10-CM

## 2021-02-26 DIAGNOSIS — Z11.59 NEED FOR HEPATITIS C SCREENING TEST: ICD-10-CM

## 2021-02-26 DIAGNOSIS — E55.9 VITAMIN D DEFICIENCY: ICD-10-CM

## 2021-02-26 DIAGNOSIS — M25.562 CHRONIC PAIN OF LEFT KNEE: ICD-10-CM

## 2021-02-26 DIAGNOSIS — F32.A DEPRESSIVE DISORDER: ICD-10-CM

## 2021-02-26 DIAGNOSIS — Z00.00 ANNUAL PHYSICAL EXAM: Primary | ICD-10-CM

## 2021-02-26 PROCEDURE — 99396 PREV VISIT EST AGE 40-64: CPT | Performed by: LEGAL MEDICINE

## 2021-02-26 NOTE — PROGRESS NOTES
Randene Najjar is a 62 y.o. female (: 1960) presenting to address:    Chief Complaint   Patient presents with    Complete Physical     no Pap (Dr. Arpan Dee)       Vitals:    21 1519   BP: 124/85   Pulse: 81   Resp: 16   Temp: 97.9 °F (36.6 °C)   TempSrc: Temporal   SpO2: 99%   Weight: 242 lb (109.8 kg)   Height: 5' 5\" (1.651 m)   PainSc:   0 - No pain   LMP: 2012       Hearing/Vision:   No exam data present    Learning Assessment:     Learning Assessment 10/8/2019   PRIMARY LEARNER Patient   HIGHEST LEVEL OF EDUCATION - PRIMARY LEARNER  -   BARRIERS PRIMARY LEARNER -   CO-LEARNER CAREGIVER -   PRIMARY LANGUAGE ENGLISH    NEED -   LEARNER PREFERENCE PRIMARY READING     DEMONSTRATION   ANSWERED BY patient   RELATIONSHIP SELF     Depression Screening:     3 most recent PHQ Screens 8/10/2020   Little interest or pleasure in doing things Not at all   Feeling down, depressed, irritable, or hopeless Not at all   Total Score PHQ 2 0     Fall Risk Assessment:     Fall Risk Assessment, last 12 mths 2021   Able to walk? Yes   Fall in past 12 months? 0   Do you feel unsteady? 0   Are you worried about falling 0   Number of falls in past 12 months -     Abuse Screening:     Abuse Screening Questionnaire 8/10/2020   Do you ever feel afraid of your partner? N   Are you in a relationship with someone who physically or mentally threatens you? N   Is it safe for you to go home? Y     Coordination of Care Questionaire:   1. Have you been to the ER, urgent care clinic since your last visit? Hospitalized since your last visit? NO    2. Have you seen or consulted any other health care providers outside of the [address] [place] [address] since your last visit? Include any pap smears or colon screening. YES, OBGYN    Advanced Directive:   1. Do you have an Advanced Directive? NO    2. Would you like information on Advanced Directives?  NO

## 2021-02-26 NOTE — PROGRESS NOTES
Nilda Govea     Chief Complaint   Patient presents with    Complete Physical     no Pap (Dr. Derick Castillo)     Vitals:    21 1519   BP: 124/85   Pulse: 81   Resp: 16   Temp: 97.9 °F (36.6 °C)   TempSrc: Temporal   SpO2: 99%   Weight: 242 lb (109.8 kg)   Height: 5' 5\" (1.651 m)   PainSc:   0 - No pain   LMP: 2012         HPI:pateint is here for annual physical     She has no acute complaints  No smoking no excessive  drinking alcohol  3 glasses of wine     She has not loss much weight she is not exercising, and she loves sweet          Past Medical History:   Diagnosis Date    Acute pain of right ear 2015    Depressive disorder, not elsewhere classified     Gynecologic exam normal     Dr. Johnny Rousseau H/O bone density study 2013    normal    Hip pain 2014    Osteophyte     bilat knees    Sleep apnea     TMJ (temporomandibular joint disorder)      Past Surgical History:   Procedure Laterality Date    HC MAMMO DIAG BILAT      11/10    HX CYSTECTOMY      L shoulder    HX ORTHOPAEDIC      lipoma removed     Social History     Tobacco Use    Smoking status: Never Smoker    Smokeless tobacco: Never Used   Substance Use Topics    Alcohol use: Yes     Comment: occasionally       Family History   Problem Relation Age of Onset    Thyroid Disease Mother     Pneumonia Mother     Dementia Father     Stroke Father     Diabetes Sister     Elevated Lipids Sister     Hypertension Sister     No Known Problems Brother     No Known Problems Brother     Heart Disease Maternal Grandmother             Heart Disease Maternal Aunt        Review of Systems   Constitutional: Negative for chills, fever, malaise/fatigue and weight loss. HENT: Negative for congestion, ear discharge, ear pain, hearing loss, nosebleeds, sinus pain and sore throat. Eyes: Negative for blurred vision, double vision and discharge.    Respiratory: Negative for cough, hemoptysis, sputum production and shortness of breath. Cardiovascular: Negative for chest pain, palpitations, claudication and leg swelling. Gastrointestinal: Negative for abdominal pain, constipation, diarrhea, nausea and vomiting. Genitourinary: Negative for dysuria, frequency and urgency. Musculoskeletal: Positive for joint pain. Negative for back pain, myalgias and neck pain. Skin: Negative for itching and rash. Neurological: Negative for dizziness, tingling, sensory change, speech change, focal weakness, weakness and headaches. Psychiatric/Behavioral: Negative for depression, hallucinations, substance abuse and suicidal ideas. The patient is not nervous/anxious and does not have insomnia. Physical Exam  Vitals signs and nursing note reviewed. Constitutional:       General: She is not in acute distress. Appearance: She is well-developed. She is not diaphoretic. HENT:      Head: Normocephalic and atraumatic. Eyes:      General: No scleral icterus. Right eye: No discharge. Left eye: No discharge. Conjunctiva/sclera: Conjunctivae normal.      Pupils: Pupils are equal, round, and reactive to light. Neck:      Thyroid: No thyromegaly. Cardiovascular:      Rate and Rhythm: Normal rate and regular rhythm. Heart sounds: Normal heart sounds. Pulmonary:      Effort: Pulmonary effort is normal. No respiratory distress. Breath sounds: Normal breath sounds. No wheezing or rales. Chest:      Chest wall: No tenderness. Abdominal:      General: There is no distension. Palpations: Abdomen is soft. Tenderness: There is no abdominal tenderness. There is no rebound. Musculoskeletal: Normal range of motion. General: No tenderness or deformity. Lymphadenopathy:      Cervical: No cervical adenopathy. Skin:     General: Skin is warm and dry. Coloration: Skin is not pale. Findings: No erythema or rash.    Neurological:      Mental Status: She is alert and oriented to person, place, and time. Cranial Nerves: No cranial nerve deficit. Coordination: Coordination normal.   Psychiatric:         Behavior: Behavior normal.         Thought Content: Thought content normal.         Judgment: Judgment normal.          Assessment and plan     Plan of care has been discussed with the patient, he agrees to the plan and verbalized understanding. All his questions were answered  More than 50% of the time spent in this visit was counseling the patient about  illness and treatment options         1. Depressive disorder  Stable on current medications    2. Mixed incontinence  Could not tolerate oxybutin caused her side effects     3. Chronic pain of left knee  Stable     4. Annual physical exam    - CBC WITH AUTOMATED DIFF; Future  - METABOLIC PANEL, COMPREHENSIVE; Future  - LIPID PANEL; Future    5. Vitamin D deficiency    - VITAMIN D, 25 HYDROXY; Future    6. Vitamin B 12 deficiency    - VITAMIN B12; Future    7. Need for hepatitis C screening test    - HEPATITIS C AB; Future    Current Outpatient Medications   Medication Sig Dispense Refill    sertraline (ZOLOFT) 50 mg tablet TAKE 1 & 1/2 (ONE & ONE-HALF) TABLETS BY MOUTH ONCE DAILY (Patient taking differently: 50 mg daily.) 135 Tab 0    cholecalciferol, vitamin D3, (VITAMIN D3) 2,000 unit tab Take 2,000 Units by mouth daily.  calcium carbonate (CALCIUM 600 PO) Take 2 Caps by mouth daily.  cpap machine kit by Does Not Apply route. Change to auto-CPAP at 5-8 cm with humidifier. Mask of choice. Length of need 99 months. Replace mask and accessories as needed x 12 months. Download data at 30 days and fax at [phone]. Dx- GARCÍA, excessive sleepiness, obesity. DME- [employer] 1 Kit 0    Glucosamine HCl 500 mg Tab Take 1 tablet by mouth daily.  fish oil-dha-epa (FISH OIL) 1,200-144-216 mg Cap Take 1 capsule by mouth daily.  MULTIVITAMIN PO Take 1 tablet by mouth daily.       cyanocobalamin 1,000 mcg tablet Take 1,000 mcg by mouth daily. Patient Active Problem List    Diagnosis Date Noted    Sleep apnea 07/09/2019    Obesity, morbid ([address] 08/20/2018    Heel spur 11/17/2014    Vaginal dryness 08/09/2014    TMJ (temporomandibular joint disorder)     Depressive disorder      Results for orders placed or performed in visit on 10/08/19   VITAMIN D, 25 HYDROXY   Result Value Ref Range    VITAMIN D, 25-HYDROXY 44.4 32.0 - 100.0 ng/mL   VITAMIN B12   Result Value Ref Range    Vitamin B12 >2000 (H) 211 - 011 pg/mL   METABOLIC PANEL, COMPREHENSIVE   Result Value Ref Range    Glucose 96 70 - 99 mg/dL    BUN 15 6 - 22 mg/dL    Creatinine 0.7 0.5 - 1.2 mg/dL    Sodium 142 133 - 145 mmol/L    Potassium 4.4 3.5 - 5.5 mmol/L    Chloride 103 98 - 110 mmol/L    CO2 25 20 - 32 mmol/L    AST (SGOT) 25 10 - 37 U/L    ALT (SGPT) 28 5 - 40 U/L    Alk. phosphatase 86 25 - 115 U/L    Bilirubin, total 0.6 0.2 - 1.2 mg/dL    Calcium 9.4 8.4 - 10.5 mg/dL    Protein, total 7.4 6.4 - 8.3 g/dL    Albumin 4.6 3.5 - 5.0 g/dL    A-G Ratio 1.6 1.1 - 2.6 ratio    Globulin 2.8 2.0 - 4.0 g/dL    Anion gap 14.0 mmol/L    GFRAA >60.0 >60.0    GFRNA >60.0 >60.0   LIPID PANEL   Result Value Ref Range    Triglyceride 91 40 - 149 mg/dL    HDL Cholesterol 74 (H) 40 - 59 mg/dL    Cholesterol, total 209 (H) 110 - 200 mg/dL    CHOLESTEROL/HDL 2.8 0.0 - 5.0    LDL, calculated 117 (H) 50 - 99 mg/dL    VLDL, calculated 18 8 - 30 mg/dL   CBC WITH AUTOMATED DIFF   Result Value Ref Range    WBC 6.4 4.0 - 11.0 K/uL    RBC 4.41 3.80 - 5.20 M/uL    HGB 14.4 11.7 - 16.0 g/dL    HCT 44.0 35.1 - 48.0 %     (H) 80 - 95 fL    MCH 33 26 - 34 pg    MCHC 33 31 - 36 g/dL    RDW 13.7 10.0 - 15.5 %    PLATELET 867 270 - 437 K/uL    MPV 11.3 9.0 - 13.0 fL    NEUTROPHILS 54 40 - 75 %    Lymphocytes 35 20 - 45 %    MONOCYTES 6 3 - 12 %    EOSINOPHILS 4 0 - 6 %    BASOPHILS 1 0 - 2 %    ABS. NEUTROPHILS 3.5 1.8 - 7.7 K/uL    ABSOLUTE LYMPHOCYTE COUNT 2.2 1.0 - 4.8 K/uL    ABS. MONOCYTES 0.4 0.1 - 1.0 K/uL    ABS. EOSINOPHILS 0.2 0.0 - 0.5 K/uL    ABS. BASOPHILS 0.1 0.0 - 0.2 K/uL     No visits with results within 3 Month(s) from this visit. Latest known visit with results is:   Office Visit on 10/08/2019   Component Date Value Ref Range Status    VITAMIN D, 25-HYDROXY 10/08/2019 44.4  32.0 - 100.0 ng/mL Final    Vitamin B12 10/08/2019 >2000* 211 - 911 pg/mL Final    Glucose 10/08/2019 96  70 - 99 mg/dL Final    BUN 10/08/2019 15  6 - 22 mg/dL Final    Creatinine 10/08/2019 0.7  0.5 - 1.2 mg/dL Final    Sodium 10/08/2019 142  133 - 145 mmol/L Final    Potassium 10/08/2019 4.4  3.5 - 5.5 mmol/L Final    Chloride 10/08/2019 103  98 - 110 mmol/L Final    CO2 10/08/2019 25  20 - 32 mmol/L Final    AST (SGOT) 10/08/2019 25  10 - 37 U/L Final    ALT (SGPT) 10/08/2019 28  5 - 40 U/L Final    Alk. phosphatase 10/08/2019 86  25 - 115 U/L Final    Bilirubin, total 10/08/2019 0.6  0.2 - 1.2 mg/dL Final    Calcium 10/08/2019 9.4  8.4 - 10.5 mg/dL Final    Protein, total 10/08/2019 7.4  6.4 - 8.3 g/dL Final    Albumin 10/08/2019 4.6  3.5 - 5.0 g/dL Final    A-G Ratio 10/08/2019 1.6  1.1 - 2.6 ratio Final    Globulin 10/08/2019 2.8  2.0 - 4.0 g/dL Final    Anion gap 10/08/2019 14.0  mmol/L Final    Comment: Estimated GFR results are reported in mL/min/1.73 sq.m. by the MDRD equation. This eGFR is validated for stable chronic renal failure patients. This   equation  is unreliable in acute illness or patients with normal renal function.       GFRAA 10/08/2019 >60.0  >60.0 Final    GFRNA 10/08/2019 >60.0  >60.0 Final    Triglyceride 10/08/2019 91  40 - 149 mg/dL Final    HDL Cholesterol 10/08/2019 74* 40 - 59 mg/dL Final    Cholesterol, total 10/08/2019 209* 110 - 200 mg/dL Final    CHOLESTEROL/HDL 10/08/2019 2.8  0.0 - 5.0 Final    LDL, calculated 10/08/2019 117* 50 - 99 mg/dL Final    VLDL, calculated 10/08/2019 18  8 - 30 mg/dL Final    Comment: Cholesterol Recommended NCEP guidelines in mg/dL:  Less than 200            Desirable  200 - 239                Borderline High  Greater than or  = 240   High  Please Note:  Total Chol/HDL Ratio                   Men     Women  1/2 Avg. Risk    3.4     3.3      Avg. Risk    5.0     4.4  2X  Avg. Risk    9.6     7.1  3X  Avg. Risk   23.4    11.0      WBC 10/08/2019 6.4  4.0 - 11.0 K/uL Final    RBC 10/08/2019 4.41  3.80 - 5.20 M/uL Final    HGB 10/08/2019 14.4  11.7 - 16.0 g/dL Final    HCT 10/08/2019 44.0  35.1 - 48.0 % Final    MCV 10/08/2019 100* 80 - 95 fL Final    MCH 10/08/2019 33  26 - 34 pg Final    MCHC 10/08/2019 33  31 - 36 g/dL Final    RDW 10/08/2019 13.7  10.0 - 15.5 % Final    PLATELET 27/47/6414 902  140 - 440 K/uL Final    MPV 10/08/2019 11.3  9.0 - 13.0 fL Final    NEUTROPHILS 10/08/2019 54  40 - 75 % Final    Lymphocytes 10/08/2019 35  20 - 45 % Final    MONOCYTES 10/08/2019 6  3 - 12 % Final    EOSINOPHILS 10/08/2019 4  0 - 6 % Final    BASOPHILS 10/08/2019 1  0 - 2 % Final    ABS. NEUTROPHILS 10/08/2019 3.5  1.8 - 7.7 K/uL Final    ABSOLUTE LYMPHOCYTE COUNT 10/08/2019 2.2  1.0 - 4.8 K/uL Final    ABS. MONOCYTES 10/08/2019 0.4  0.1 - 1.0 K/uL Final    ABS. EOSINOPHILS 10/08/2019 0.2  0.0 - 0.5 K/uL Final    ABS. BASOPHILS 10/08/2019 0.1  0.0 - 0.2 K/uL Final          Follow-up and Dispositions    · Return for as per results.

## 2021-03-02 ENCOUNTER — APPOINTMENT (OUTPATIENT)
Dept: FAMILY MEDICINE CLINIC | Age: 61
End: 2021-03-02

## 2021-03-02 DIAGNOSIS — E53.8 VITAMIN B 12 DEFICIENCY: ICD-10-CM

## 2021-03-02 DIAGNOSIS — E55.9 VITAMIN D DEFICIENCY: ICD-10-CM

## 2021-03-02 DIAGNOSIS — Z11.59 NEED FOR HEPATITIS C SCREENING TEST: ICD-10-CM

## 2021-03-02 DIAGNOSIS — Z00.00 ANNUAL PHYSICAL EXAM: ICD-10-CM

## 2021-03-03 LAB
25(OH)D3 SERPL-MCNC: 41.7 NG/ML (ref 32–100)
A-G RATIO,AGRAT: 1.5 RATIO (ref 1.1–2.6)
ABSOLUTE LYMPHOCYTE COUNT, 10803: 2.2 K/UL (ref 1–4.8)
ALBUMIN SERPL-MCNC: 4.4 G/DL (ref 3.5–5)
ALP SERPL-CCNC: 80 U/L (ref 25–115)
ALT SERPL-CCNC: 19 U/L (ref 5–40)
ANION GAP SERPL CALC-SCNC: 13.3 MMOL/L (ref 3–15)
AST SERPL W P-5'-P-CCNC: 17 U/L (ref 10–37)
BASOPHILS # BLD: 0.1 K/UL (ref 0–0.2)
BASOPHILS NFR BLD: 1 % (ref 0–2)
BILIRUB SERPL-MCNC: 0.8 MG/DL (ref 0.2–1.2)
BUN SERPL-MCNC: 17 MG/DL (ref 6–22)
CALCIUM SERPL-MCNC: 9.2 MG/DL (ref 8.4–10.5)
CHLORIDE SERPL-SCNC: 102 MMOL/L (ref 98–110)
CHOLEST SERPL-MCNC: 210 MG/DL (ref 110–200)
CO2 SERPL-SCNC: 27 MMOL/L (ref 20–32)
CREAT SERPL-MCNC: 0.8 MG/DL (ref 0.5–1.2)
EOSINOPHIL # BLD: 0.2 K/UL (ref 0–0.5)
EOSINOPHIL NFR BLD: 3 % (ref 0–6)
ERYTHROCYTE [DISTWIDTH] IN BLOOD BY AUTOMATED COUNT: 13.2 % (ref 10–15.5)
GFRAA, 66117: >60
GFRNA, 66118: >60
GLOBULIN,GLOB: 2.9 G/DL (ref 2–4)
GLUCOSE SERPL-MCNC: 91 MG/DL (ref 70–99)
GRANULOCYTES,GRANS: 60 % (ref 40–75)
HCT VFR BLD AUTO: 47.3 % (ref 35.1–48)
HCV AB SER IA-ACNC: NORMAL
HDLC SERPL-MCNC: 3 MG/DL (ref 0–5)
HDLC SERPL-MCNC: 70 MG/DL
HGB BLD-MCNC: 14.7 G/DL (ref 11.7–16)
LDL/HDL RATIO,LDHD: 1.7
LDLC SERPL CALC-MCNC: 118 MG/DL (ref 50–99)
LYMPHOCYTES, LYMLT: 31 % (ref 20–45)
MCH RBC QN AUTO: 33 PG (ref 26–34)
MCHC RBC AUTO-ENTMCNC: 31 G/DL (ref 31–36)
MCV RBC AUTO: 104 FL (ref 81–99)
MONOCYTES # BLD: 0.4 K/UL (ref 0.1–1)
MONOCYTES NFR BLD: 6 % (ref 3–12)
NEUTROPHILS # BLD AUTO: 4.2 K/UL (ref 1.8–7.7)
NON-HDL CHOLESTEROL, 011976: 140 MG/DL
PLATELET # BLD AUTO: 233 K/UL (ref 140–440)
PMV BLD AUTO: 12.1 FL (ref 9–13)
POTASSIUM SERPL-SCNC: 4.2 MMOL/L (ref 3.5–5.5)
PROT SERPL-MCNC: 7.3 G/DL (ref 6.4–8.3)
RBC # BLD AUTO: 4.53 M/UL (ref 3.8–5.2)
SODIUM SERPL-SCNC: 142 MMOL/L (ref 133–145)
TRIGL SERPL-MCNC: 109 MG/DL (ref 40–149)
VIT B12 SERPL-MCNC: 763 PG/ML (ref 211–911)
VLDLC SERPL CALC-MCNC: 22 MG/DL (ref 8–30)
WBC # BLD AUTO: 7.1 K/UL (ref 4–11)

## 2021-03-15 NOTE — PROGRESS NOTES
.  Her labs are within normal limits except high cholesterol patient need to be adherent to lifestyle modification and exercise

## 2021-07-15 DIAGNOSIS — F32.A DEPRESSIVE DISORDER: ICD-10-CM

## 2021-07-19 ENCOUNTER — TELEPHONE (OUTPATIENT)
Dept: FAMILY MEDICINE CLINIC | Age: 61
End: 2021-07-19

## 2021-07-19 DIAGNOSIS — F32.A DEPRESSIVE DISORDER: ICD-10-CM

## 2021-07-19 NOTE — TELEPHONE ENCOUNTER
Patient called to check the status of her medication refill. It looks like the refill request was sent to [facility] instead of [facility].  Please advise      Requested Prescriptions     Pending Prescriptions Disp Refills    sertraline (ZOLOFT) 50 mg tablet 135 Tablet 0

## 2021-07-19 NOTE — TELEPHONE ENCOUNTER
Last ordered 01/30/2021 135 tabs with 0 refills  No future appointments.   Last appointment was 02/26/2021

## 2021-07-21 RX ORDER — SERTRALINE HYDROCHLORIDE 50 MG/1
50 TABLET, FILM COATED ORAL DAILY
Qty: 90 TABLET | Refills: 0 | Status: SHIPPED | OUTPATIENT
Start: 2021-07-21 | End: 2021-08-13 | Stop reason: SDUPTHER

## 2021-07-23 RX ORDER — SERTRALINE HYDROCHLORIDE 50 MG/1
50 TABLET, FILM COATED ORAL DAILY
OUTPATIENT
Start: 2021-07-23

## 2021-08-13 ENCOUNTER — OFFICE VISIT (OUTPATIENT)
Dept: FAMILY MEDICINE CLINIC | Age: 61
End: 2021-08-13
Payer: COMMERCIAL

## 2021-08-13 ENCOUNTER — TELEPHONE (OUTPATIENT)
Dept: FAMILY MEDICINE CLINIC | Age: 61
End: 2021-08-13

## 2021-08-13 VITALS
WEIGHT: 246 LBS | HEIGHT: 65 IN | OXYGEN SATURATION: 97 % | TEMPERATURE: 97.3 F | DIASTOLIC BLOOD PRESSURE: 84 MMHG | SYSTOLIC BLOOD PRESSURE: 129 MMHG | RESPIRATION RATE: 16 BRPM | BODY MASS INDEX: 40.98 KG/M2 | HEART RATE: 104 BPM

## 2021-08-13 DIAGNOSIS — E66.01 OBESITY, MORBID (HCC): Primary | ICD-10-CM

## 2021-08-13 DIAGNOSIS — F32.A DEPRESSIVE DISORDER: ICD-10-CM

## 2021-08-13 DIAGNOSIS — N39.46 MIXED INCONTINENCE: ICD-10-CM

## 2021-08-13 PROCEDURE — 99214 OFFICE O/P EST MOD 30 MIN: CPT | Performed by: LEGAL MEDICINE

## 2021-08-13 RX ORDER — SERTRALINE HYDROCHLORIDE 50 MG/1
50 TABLET, FILM COATED ORAL DAILY
Qty: 90 TABLET | Refills: 3 | Status: SHIPPED | OUTPATIENT
Start: 2021-08-13 | End: 2021-10-21

## 2021-08-13 RX ORDER — MELOXICAM 7.5 MG/1
7.5 TABLET ORAL
COMMUNITY
Start: 2021-08-06

## 2021-08-13 NOTE — PROGRESS NOTES
Alba Smith     Chief Complaint   Patient presents with    Medication Management     Vitals:    21 1440   BP: 129/84   Pulse: (!) 104   Resp: 16   Temp: 97.3 °F (36.3 °C)   TempSrc: Temporal   SpO2: 97%   Weight: 246 lb (111.6 kg)   Height: 5' 5\" (1.651 m)   LMP: 2012         HPI:Patient is here for follow up and medication refill    She is on Zoloft stable     Still has left knee pain  And with decrease range of motion due to OA  Advised to get TKR by ortho  She does not want to do it now    she is seeing [facility]     she also having right hip pain due  bursitis       She is having left foot pain due Left planter Facsiitis   And seeing  [facility] and had steroid injection but she is still symptomatic       Past Medical History:   Diagnosis Date    Acute pain of right ear 2015    Depressive disorder, not elsewhere classified     Gynecologic exam normal     Dr. Eros Kiran H/O bone density study 2013    normal    Hip pain 2014    Osteophyte     bilat knees    Sleep apnea     TMJ (temporomandibular joint disorder)      Past Surgical History:   Procedure Laterality Date    HC MAMMO DIAG BILAT      11/10    HX CYSTECTOMY      L shoulder    HX ORTHOPAEDIC      lipoma removed     Social History     Tobacco Use    Smoking status: Never Smoker    Smokeless tobacco: Never Used   Substance Use Topics    Alcohol use: Yes     Comment: occasionally       Family History   Problem Relation Age of Onset    Thyroid Disease Mother     Pneumonia Mother     Dementia Father     Stroke Father     Diabetes Sister     Elevated Lipids Sister     Hypertension Sister     No Known Problems Brother     No Known Problems Brother     Heart Disease Maternal Grandmother             Heart Disease Maternal Aunt        Review of Systems   Constitutional: Negative for chills, fever, malaise/fatigue and weight loss.    HENT: Negative for congestion, ear discharge, ear pain, hearing loss and nosebleeds. Eyes: Negative for blurred vision, double vision and discharge. Respiratory: Negative for cough, hemoptysis, sputum production, shortness of breath and wheezing. Cardiovascular: Negative for chest pain, palpitations, claudication and leg swelling. Gastrointestinal: Negative for abdominal pain, constipation, diarrhea, nausea and vomiting. Genitourinary: Negative for dysuria, frequency and urgency. Musculoskeletal: Positive for joint pain and myalgias. Right hip,left foot ,left knee    Skin: Negative for itching and rash. Neurological: Negative for dizziness, tingling, sensory change, speech change, focal weakness, weakness and headaches. Psychiatric/Behavioral: Negative for depression, hallucinations, substance abuse and suicidal ideas. The patient is not nervous/anxious and does not have insomnia. Physical Exam  Constitutional:       General: She is not in acute distress. Appearance: She is well-developed. She is not diaphoretic. HENT:      Head: Normocephalic and atraumatic. Neck:      Thyroid: No thyromegaly. Cardiovascular:      Rate and Rhythm: Normal rate and regular rhythm. Heart sounds: Normal heart sounds. Pulmonary:      Effort: Pulmonary effort is normal. No respiratory distress. Breath sounds: Normal breath sounds. No wheezing or rales. Chest:      Chest wall: No tenderness. Abdominal:      General: There is no distension. Palpations: Abdomen is soft. Tenderness: There is no abdominal tenderness. There is no rebound. Musculoskeletal:         General: No tenderness or deformity. Lymphadenopathy:      Cervical: No cervical adenopathy. Skin:     General: Skin is warm and dry. Coloration: Skin is not pale. Findings: No erythema or rash. Neurological:      Mental Status: She is alert and oriented to person, place, and time.       Cranial Nerves: No cranial nerve deficit. Coordination: Coordination normal.   Psychiatric:         Behavior: Behavior normal.         Thought Content: Thought content normal.         Judgment: Judgment normal.          Assessment and plan     Plan of care has been discussed with the patient, he agrees to the plan and verbalized understanding. All his questions were answered  More than 50% of the time spent in this visit was counseling the patient about  illness and treatment options         1. Depressive disorder  Stable on current medications   - sertraline (ZOLOFT) 50 mg tablet; Take 1 Tablet by mouth daily for 90 days. Dispense: 90 Tablet; Refill: 3    2. Obesity, morbid ([address]  I had long discussions with patient regarding importance of weight loss and will help her knee ,and foot pain and overall health     3. Mixed incontinence  No changes her symptoms ,prefer not to take medications     Current Outpatient Medications   Medication Sig Dispense Refill    meloxicam (MOBIC) 7.5 mg tablet 7.5 mg.      sertraline (ZOLOFT) 50 mg tablet Take 1 Tablet by mouth daily for 90 days. 90 Tablet 3    cholecalciferol, vitamin D3, (VITAMIN D3) 2,000 unit tab Take 2,000 Units by mouth daily.  calcium carbonate (CALCIUM 600 PO) Take 2 Caps by mouth daily.  Glucosamine HCl 500 mg Tab Take 1 tablet by mouth daily.  MULTIVITAMIN PO Take 1 tablet by mouth daily.          Patient Active Problem List    Diagnosis Date Noted    Sleep apnea 07/09/2019    Obesity, morbid ([facility] [address] 08/20/2018    Heel spur 11/17/2014    Vaginal dryness 08/09/2014    TMJ (temporomandibular joint disorder)     Depressive disorder      Results for orders placed or performed in visit on 03/02/21   HEPATITIS C AB   Result Value Ref Range    Hep C Virus Ab None Detected None Detec   LIPID PANEL   Result Value Ref Range    Triglyceride 109 40 - 149 mg/dL    HDL Cholesterol 70 >=40 mg/dL    Cholesterol, total 210 (H) 110 - 200 mg/dL    CHOLESTEROL/HDL 3.0 0.0 - 5.0 Non-HDL Cholesterol 140 (H) <130 mg/dL    LDL, calculated 118 (H) 50 - 99 mg/dL    VLDL, calculated 22 8 - 30 mg/dL    LDL/HDL Ratio 1.7    VITAMIN B12   Result Value Ref Range    Vitamin B12 763 211 - 911 pg/mL   VITAMIN D, 25 HYDROXY   Result Value Ref Range    VITAMIN D, 25-HYDROXY 41.7 32.0 - 302.3 ng/mL   METABOLIC PANEL, COMPREHENSIVE   Result Value Ref Range    Glucose 91 70 - 99 mg/dL    BUN 17 6 - 22 mg/dL    Creatinine 0.8 0.5 - 1.2 mg/dL    Sodium 142 133 - 145 mmol/L    Potassium 4.2 3.5 - 5.5 mmol/L    Chloride 102 98 - 110 mmol/L    CO2 27 20 - 32 mmol/L    AST (SGOT) 17 10 - 37 U/L    ALT (SGPT) 19 5 - 40 U/L    Alk. phosphatase 80 25 - 115 U/L    Bilirubin, total 0.8 0.2 - 1.2 mg/dL    Calcium 9.2 8.4 - 10.5 mg/dL    Protein, total 7.3 6.4 - 8.3 g/dL    Albumin 4.4 3.5 - 5.0 g/dL    A-G Ratio 1.5 1.1 - 2.6 ratio    Globulin 2.9 2.0 - 4.0 g/dL    Anion gap 13.3 3.0 - 15.0 mmol/L    GFRAA >60.0 >60.0    GFRNA >60.0 >60.0   CBC WITH AUTOMATED DIFF   Result Value Ref Range    WBC 7.1 4.0 - 11.0 K/uL    RBC 4.53 3.80 - 5.20 M/uL    HGB 14.7 11.7 - 16.0 g/dL    HCT 47.3 35.1 - 48.0 %     (H) 81 - 99 fL    MCH 33 26 - 34 pg    MCHC 31 31 - 36 g/dL    RDW 13.2 10.0 - 15.5 %    PLATELET 285 779 - 241 K/uL    MPV 12.1 9.0 - 13.0 fL    NEUTROPHILS 60 40 - 75 %    Lymphocytes 31 20 - 45 %    MONOCYTES 6 3 - 12 %    EOSINOPHILS 3 0 - 6 %    BASOPHILS 1 0 - 2 %    ABS. NEUTROPHILS 4.2 1.8 - 7.7 K/uL    ABSOLUTE LYMPHOCYTE COUNT 2.2 1.0 - 4.8 K/uL    ABS. MONOCYTES 0.4 0.1 - 1.0 K/uL    ABS. EOSINOPHILS 0.2 0.0 - 0.5 K/uL    ABS. BASOPHILS 0.1 0.0 - 0.2 K/uL     No visits with results within 3 Month(s) from this visit.    Latest known visit with results is:   Appointment on 03/02/2021   Component Date Value Ref Range Status    Hep C Virus Ab 03/02/2021 None Detected  None Detec Final    Triglyceride 03/02/2021 109  40 - 149 mg/dL Final    HDL Cholesterol 03/02/2021 70  >=40 mg/dL Final    Cholesterol, total 03/02/2021 210* 110 - 200 mg/dL Final    CHOLESTEROL/HDL 03/02/2021 3.0  0.0 - 5.0 Final    Non-HDL Cholesterol 03/02/2021 140* <130 mg/dL Final    LDL, calculated 03/02/2021 118* 50 - 99 mg/dL Final    VLDL, calculated 03/02/2021 22  8 - 30 mg/dL Final    LDL/HDL Ratio 03/02/2021 1.7   Final    Comment: Test includes cholesterol, HDL cholesterol, triglycerides and LDL. Cholesterol Recommended NCEP guidelines in mg/dL:  Less than 200            Desirable  200 - 239                Borderline High  Greater than or  = 240   High  Please Note:  Total Chol/HDL Ratio                   Men     Women  1/2 Avg. Risk    3.4     3.3      Avg. Risk    5.0     4.4  2X  Avg. Risk    9.6     7.1  3X  Avg. Risk   23.4    11.0      Vitamin B12 03/02/2021 763  211 - 911 pg/mL Final    VITAMIN D, 25-HYDROXY 03/02/2021 41.7  32.0 - 100.0 ng/mL Final    Glucose 03/02/2021 91  70 - 99 mg/dL Final    BUN 03/02/2021 17  6 - 22 mg/dL Final    Creatinine 03/02/2021 0.8  0.5 - 1.2 mg/dL Final    Sodium 03/02/2021 142  133 - 145 mmol/L Final    Potassium 03/02/2021 4.2  3.5 - 5.5 mmol/L Final    Chloride 03/02/2021 102  98 - 110 mmol/L Final    CO2 03/02/2021 27  20 - 32 mmol/L Final    AST (SGOT) 03/02/2021 17  10 - 37 U/L Final    ALT (SGPT) 03/02/2021 19  5 - 40 U/L Final    Alk. phosphatase 03/02/2021 80  25 - 115 U/L Final    Bilirubin, total 03/02/2021 0.8  0.2 - 1.2 mg/dL Final    Calcium 03/02/2021 9.2  8.4 - 10.5 mg/dL Final    Protein, total 03/02/2021 7.3  6.4 - 8.3 g/dL Final    Albumin 03/02/2021 4.4  3.5 - 5.0 g/dL Final    A-G Ratio 03/02/2021 1.5  1.1 - 2.6 ratio Final    Globulin 03/02/2021 2.9  2.0 - 4.0 g/dL Final    Anion gap 03/02/2021 13.3  3.0 - 15.0 mmol/L Final    Comment: Anion Gap calculation based on electrolyte reference ranges.   Test includes Albumin, Alkaline Phosphatase, ALT, AST, BUN, Calcium, CO2,  Chloride, Creatinine, Glucose, Potassium, Sodium, Total Bilirubin and Total  Protein. Estimated GFR results are reported in mL/min/1.73 sq.m. by the MDRD equation. This eGFR is validated for stable chronic renal failure patients. This   equation  is unreliable in acute illness or patients with normal renal function.  GFRAA 03/02/2021 >60.0  >60.0 Final    GFRNA 03/02/2021 >60.0  >60.0 Final    WBC 03/02/2021 7.1  4.0 - 11.0 K/uL Final    RBC 03/02/2021 4.53  3.80 - 5.20 M/uL Final    HGB 03/02/2021 14.7  11.7 - 16.0 g/dL Final    HCT 03/02/2021 47.3  35.1 - 48.0 % Final    MCV 03/02/2021 104* 81 - 99 fL Final    MCH 03/02/2021 33  26 - 34 pg Final    MCHC 03/02/2021 31  31 - 36 g/dL Final    RDW 03/02/2021 13.2  10.0 - 15.5 % Final    PLATELET 17/55/9271 581  140 - 440 K/uL Final    MPV 03/02/2021 12.1  9.0 - 13.0 fL Final    NEUTROPHILS 03/02/2021 60  40 - 75 % Final    Lymphocytes 03/02/2021 31  20 - 45 % Final    MONOCYTES 03/02/2021 6  3 - 12 % Final    EOSINOPHILS 03/02/2021 3  0 - 6 % Final    BASOPHILS 03/02/2021 1  0 - 2 % Final    ABS. NEUTROPHILS 03/02/2021 4.2  1.8 - 7.7 K/uL Final    ABSOLUTE LYMPHOCYTE COUNT 03/02/2021 2.2  1.0 - 4.8 K/uL Final    ABS. MONOCYTES 03/02/2021 0.4  0.1 - 1.0 K/uL Final    ABS. EOSINOPHILS 03/02/2021 0.2  0.0 - 0.5 K/uL Final    ABS. BASOPHILS 03/02/2021 0.1  0.0 - 0.2 K/uL Final          Follow-up and Dispositions    · Return in about 7 months (around 3/1/2022).

## 2021-08-13 NOTE — PROGRESS NOTES
Tiffany Flower is a 62 y.o. female (: 1960) presenting to address:    Chief Complaint   Patient presents with    Medication Management       Vitals:    21 1440   BP: 129/84   Pulse: (!) 104   Resp: 16   Temp: 97.3 °F (36.3 °C)   TempSrc: Temporal   SpO2: 97%   Weight: 246 lb (111.6 kg)   Height: 5' 5\" (1.651 m)   LMP: 2012       Hearing/Vision:   No exam data present    Learning Assessment:     Learning Assessment 10/8/2019   PRIMARY LEARNER Patient   HIGHEST LEVEL OF EDUCATION - PRIMARY LEARNER  -   BARRIERS PRIMARY LEARNER -   CO-LEARNER CAREGIVER -   PRIMARY LANGUAGE ENGLISH    NEED -   LEARNER PREFERENCE PRIMARY READING     DEMONSTRATION   ANSWERED BY patient   RELATIONSHIP SELF     Depression Screening:     3 most recent PHQ Screens 2021   Little interest or pleasure in doing things Several days   Feeling down, depressed, irritable, or hopeless Several days   Total Score PHQ 2 2   Trouble falling or staying asleep, or sleeping too much Not at all   Feeling tired or having little energy Several days   Poor appetite, weight loss, or overeating More than half the days   Feeling bad about yourself - or that you are a failure or have let yourself or your family down Several days   Trouble concentrating on things such as school, work, reading, or watching TV Not at all   Moving or speaking so slowly that other people could have noticed; or the opposite being so fidgety that others notice Not at all   Thoughts of being better off dead, or hurting yourself in some way Not at all   PHQ 9 Score 6     Fall Risk Assessment:     Fall Risk Assessment, last 12 mths 2021   Able to walk? Yes   Fall in past 12 months? 0   Do you feel unsteady? 0   Are you worried about falling 0   Number of falls in past 12 months -     Abuse Screening:     Abuse Screening Questionnaire 2021   Do you ever feel afraid of your partner?  N   Are you in a relationship with someone who physically or mentally threatens you? N   Is it safe for you to go home? Y     Coordination of Care Questionaire:   1. Have you been to the ER, urgent care clinic since your last visit? Hospitalized since your last visit? NO    2. Have you seen or consulted any other health care providers outside of the [address] [place] [address] since your last visit? Include any pap smears or colon screening. YES, Podiatrist    Advanced Directive:   1. Do you have an Advanced Directive? NO    2. Would you like information on Advanced Directives?  NO

## 2021-08-13 NOTE — TELEPHONE ENCOUNTER
Zeynep Cavazos MD      Obstetrics & Gynecology + 1 more     Since 9/15/2010     [phone] + 1 more         Please need mammogram and pap results

## 2021-09-21 ENCOUNTER — OFFICE VISIT (OUTPATIENT)
Dept: FAMILY MEDICINE CLINIC | Age: 61
End: 2021-09-21
Payer: COMMERCIAL

## 2021-09-21 VITALS
HEIGHT: 65 IN | OXYGEN SATURATION: 97 % | RESPIRATION RATE: 15 BRPM | SYSTOLIC BLOOD PRESSURE: 124 MMHG | TEMPERATURE: 97.5 F | DIASTOLIC BLOOD PRESSURE: 78 MMHG | BODY MASS INDEX: 41.09 KG/M2 | HEART RATE: 70 BPM | WEIGHT: 246.6 LBS

## 2021-09-21 DIAGNOSIS — Z23 NEEDS FLU SHOT: Primary | ICD-10-CM

## 2021-09-21 DIAGNOSIS — L03.119 CELLULITIS OF LOWER EXTREMITY, UNSPECIFIED LATERALITY: ICD-10-CM

## 2021-09-21 PROCEDURE — 99213 OFFICE O/P EST LOW 20 MIN: CPT | Performed by: LEGAL MEDICINE

## 2021-09-21 RX ORDER — DOXYCYCLINE 100 MG/1
100 TABLET ORAL 2 TIMES DAILY
Qty: 20 TABLET | Refills: 0 | Status: SHIPPED | OUTPATIENT
Start: 2021-09-21 | End: 2021-10-01

## 2021-09-21 NOTE — PROGRESS NOTES
Arley Bowieley     Chief Complaint   Patient presents with    Foot Swelling     left foot swelling     Vitals:    21 1538   BP: 124/78   Pulse: 70   Resp: 15   Temp: 97.5 °F (36.4 °C)   TempSrc: Temporal   SpO2: 97%   Weight: 246 lb 9.6 oz (111.9 kg)   Height: 5' 5\" (1.651 m)   PainSc:   3   PainLoc: Foot   LMP: 2012         HPI:has redness, swelling and tghtness of left lower leg today , left foot dicomfort for 2 days no inesect bite, no skin lesion no any other trauma in her feet    She only has redness of the left foot and now the right leg , her both lower extremity feels tight    She has no fever no chills no any other constitutional symptoms    Past Medical History:   Diagnosis Date    Acute pain of right ear 2015    Depressive disorder, not elsewhere classified     Gynecologic exam normal     Dr. Paige Díaz H/O bone density study 2013    normal    Hip pain 2014    Osteophyte     bilat knees    Sleep apnea     TMJ (temporomandibular joint disorder)      Past Surgical History:   Procedure Laterality Date    HC MAMMO DIAG BILAT      11/10    HX CYSTECTOMY      L shoulder    HX ORTHOPAEDIC      lipoma removed     Social History     Tobacco Use    Smoking status: Never Smoker    Smokeless tobacco: Never Used   Substance Use Topics    Alcohol use: Yes     Comment: occasionally       Family History   Problem Relation Age of Onset    Thyroid Disease Mother     Pneumonia Mother     Dementia Father     Stroke Father     Diabetes Sister     Elevated Lipids Sister     Hypertension Sister     No Known Problems Brother     No Known Problems Brother     Heart Disease Maternal Grandmother             Heart Disease Maternal Aunt        ROS    Physical Exam     Assessment and plan     Plan of care has been discussed with the patient, he agrees to the plan and verbalized understanding.   All his questions were answered  More than 50% of the time spent in this visit was counseling the patient about  illness and treatment options               2. Cellulitis of lower extremity, unspecified laterality    Possible cellulitis of lower extremities to be started on antibiotics if no improvement please contact the office for follow-up    - doxycycline (ADOXA) 100 mg tablet; Take 1 Tablet by mouth two (2) times a day for 10 days. Dispense: 20 Tablet; Refill: 0    Current Outpatient Medications   Medication Sig Dispense Refill    doxycycline (ADOXA) 100 mg tablet Take 1 Tablet by mouth two (2) times a day for 10 days. 20 Tablet 0    meloxicam (MOBIC) 7.5 mg tablet 7.5 mg.      sertraline (ZOLOFT) 50 mg tablet Take 1 Tablet by mouth daily for 90 days. 90 Tablet 3    cholecalciferol, vitamin D3, (VITAMIN D3) 2,000 unit tab Take 2,000 Units by mouth daily.  calcium carbonate (CALCIUM 600 PO) Take 2 Caps by mouth daily.  Glucosamine HCl 500 mg Tab Take 1 tablet by mouth daily.  MULTIVITAMIN PO Take 1 tablet by mouth daily.          Patient Active Problem List    Diagnosis Date Noted    Sleep apnea 07/09/2019    Obesity, morbid ([address] 08/20/2018    Heel spur 11/17/2014    Vaginal dryness 08/09/2014    TMJ (temporomandibular joint disorder)     Depressive disorder      Results for orders placed or performed in visit on 03/02/21   HEPATITIS C AB   Result Value Ref Range    Hep C Virus Ab None Detected None Detec   LIPID PANEL   Result Value Ref Range    Triglyceride 109 40 - 149 mg/dL    HDL Cholesterol 70 >=40 mg/dL    Cholesterol, total 210 (H) 110 - 200 mg/dL    CHOLESTEROL/HDL 3.0 0.0 - 5.0    Non-HDL Cholesterol 140 (H) <130 mg/dL    LDL, calculated 118 (H) 50 - 99 mg/dL    VLDL, calculated 22 8 - 30 mg/dL    LDL/HDL Ratio 1.7    VITAMIN B12   Result Value Ref Range    Vitamin B12 763 211 - 911 pg/mL   VITAMIN D, 25 HYDROXY   Result Value Ref Range    VITAMIN D, 25-HYDROXY 41.7 32.0 - 712.9 ng/mL   METABOLIC PANEL, COMPREHENSIVE   Result Value Ref Range    Glucose 91 70 - 99 mg/dL    BUN 17 6 - 22 mg/dL    Creatinine 0.8 0.5 - 1.2 mg/dL    Sodium 142 133 - 145 mmol/L    Potassium 4.2 3.5 - 5.5 mmol/L    Chloride 102 98 - 110 mmol/L    CO2 27 20 - 32 mmol/L    AST (SGOT) 17 10 - 37 U/L    ALT (SGPT) 19 5 - 40 U/L    Alk. phosphatase 80 25 - 115 U/L    Bilirubin, total 0.8 0.2 - 1.2 mg/dL    Calcium 9.2 8.4 - 10.5 mg/dL    Protein, total 7.3 6.4 - 8.3 g/dL    Albumin 4.4 3.5 - 5.0 g/dL    A-G Ratio 1.5 1.1 - 2.6 ratio    Globulin 2.9 2.0 - 4.0 g/dL    Anion gap 13.3 3.0 - 15.0 mmol/L    GFRAA >60.0 >60.0    GFRNA >60.0 >60.0   CBC WITH AUTOMATED DIFF   Result Value Ref Range    WBC 7.1 4.0 - 11.0 K/uL    RBC 4.53 3.80 - 5.20 M/uL    HGB 14.7 11.7 - 16.0 g/dL    HCT 47.3 35.1 - 48.0 %     (H) 81 - 99 fL    MCH 33 26 - 34 pg    MCHC 31 31 - 36 g/dL    RDW 13.2 10.0 - 15.5 %    PLATELET 575 182 - 641 K/uL    MPV 12.1 9.0 - 13.0 fL    NEUTROPHILS 60 40 - 75 %    Lymphocytes 31 20 - 45 %    MONOCYTES 6 3 - 12 %    EOSINOPHILS 3 0 - 6 %    BASOPHILS 1 0 - 2 %    ABS. NEUTROPHILS 4.2 1.8 - 7.7 K/uL    ABSOLUTE LYMPHOCYTE COUNT 2.2 1.0 - 4.8 K/uL    ABS. MONOCYTES 0.4 0.1 - 1.0 K/uL    ABS. EOSINOPHILS 0.2 0.0 - 0.5 K/uL    ABS. BASOPHILS 0.1 0.0 - 0.2 K/uL     No visits with results within 3 Month(s) from this visit.    Latest known visit with results is:   Appointment on 03/02/2021   Component Date Value Ref Range Status    Hep C Virus Ab 03/02/2021 None Detected  None Detec Final    Triglyceride 03/02/2021 109  40 - 149 mg/dL Final    HDL Cholesterol 03/02/2021 70  >=40 mg/dL Final    Cholesterol, total 03/02/2021 210* 110 - 200 mg/dL Final    CHOLESTEROL/HDL 03/02/2021 3.0  0.0 - 5.0 Final    Non-HDL Cholesterol 03/02/2021 140* <130 mg/dL Final    LDL, calculated 03/02/2021 118* 50 - 99 mg/dL Final    VLDL, calculated 03/02/2021 22  8 - 30 mg/dL Final    LDL/HDL Ratio 03/02/2021 1.7   Final    Comment: Test includes cholesterol, HDL cholesterol, triglycerides and LDL.  Cholesterol Recommended NCEP guidelines in mg/dL:  Less than 200            Desirable  200 - 239                Borderline High  Greater than or  = 240   High  Please Note:  Total Chol/HDL Ratio                   Men     Women  1/2 Avg. Risk    3.4     3.3      Avg. Risk    5.0     4.4  2X  Avg. Risk    9.6     7.1  3X  Avg. Risk   23.4    11.0      Vitamin B12 03/02/2021 763  211 - 911 pg/mL Final    VITAMIN D, 25-HYDROXY 03/02/2021 41.7  32.0 - 100.0 ng/mL Final    Glucose 03/02/2021 91  70 - 99 mg/dL Final    BUN 03/02/2021 17  6 - 22 mg/dL Final    Creatinine 03/02/2021 0.8  0.5 - 1.2 mg/dL Final    Sodium 03/02/2021 142  133 - 145 mmol/L Final    Potassium 03/02/2021 4.2  3.5 - 5.5 mmol/L Final    Chloride 03/02/2021 102  98 - 110 mmol/L Final    CO2 03/02/2021 27  20 - 32 mmol/L Final    AST (SGOT) 03/02/2021 17  10 - 37 U/L Final    ALT (SGPT) 03/02/2021 19  5 - 40 U/L Final    Alk. phosphatase 03/02/2021 80  25 - 115 U/L Final    Bilirubin, total 03/02/2021 0.8  0.2 - 1.2 mg/dL Final    Calcium 03/02/2021 9.2  8.4 - 10.5 mg/dL Final    Protein, total 03/02/2021 7.3  6.4 - 8.3 g/dL Final    Albumin 03/02/2021 4.4  3.5 - 5.0 g/dL Final    A-G Ratio 03/02/2021 1.5  1.1 - 2.6 ratio Final    Globulin 03/02/2021 2.9  2.0 - 4.0 g/dL Final    Anion gap 03/02/2021 13.3  3.0 - 15.0 mmol/L Final    Comment: Anion Gap calculation based on electrolyte reference ranges. Test includes Albumin, Alkaline Phosphatase, ALT, AST, BUN, Calcium, CO2,  Chloride, Creatinine, Glucose, Potassium, Sodium, Total Bilirubin and Total  Protein. Estimated GFR results are reported in mL/min/1.73 sq.m. by the MDRD equation. This eGFR is validated for stable chronic renal failure patients. This   equation  is unreliable in acute illness or patients with normal renal function.       GFRAA 03/02/2021 >60.0  >60.0 Final    GFRNA 03/02/2021 >60.0  >60.0 Final    WBC 03/02/2021 7.1  4.0 - 11.0 K/uL Final    RBC 03/02/2021 4.53  3.80 - 5.20 M/uL Final    HGB 03/02/2021 14.7  11.7 - 16.0 g/dL Final    HCT 03/02/2021 47.3  35.1 - 48.0 % Final    MCV 03/02/2021 104* 81 - 99 fL Final    MCH 03/02/2021 33  26 - 34 pg Final    MCHC 03/02/2021 31  31 - 36 g/dL Final    RDW 03/02/2021 13.2  10.0 - 15.5 % Final    PLATELET 38/94/9863 169  140 - 440 K/uL Final    MPV 03/02/2021 12.1  9.0 - 13.0 fL Final    NEUTROPHILS 03/02/2021 60  40 - 75 % Final    Lymphocytes 03/02/2021 31  20 - 45 % Final    MONOCYTES 03/02/2021 6  3 - 12 % Final    EOSINOPHILS 03/02/2021 3  0 - 6 % Final    BASOPHILS 03/02/2021 1  0 - 2 % Final    ABS. NEUTROPHILS 03/02/2021 4.2  1.8 - 7.7 K/uL Final    ABSOLUTE LYMPHOCYTE COUNT 03/02/2021 2.2  1.0 - 4.8 K/uL Final    ABS. MONOCYTES 03/02/2021 0.4  0.1 - 1.0 K/uL Final    ABS. EOSINOPHILS 03/02/2021 0.2  0.0 - 0.5 K/uL Final    ABS. BASOPHILS 03/02/2021 0.1  0.0 - 0.2 K/uL Final          Follow-up and Dispositions    · Return in about 1 week (around 9/28/2021), or if symptoms worsen or fail to improve.

## 2021-09-21 NOTE — PROGRESS NOTES
Diana Callahan is a 62 y.o. female (: 1960) presenting to address:    Chief Complaint   Patient presents with    Foot Swelling     left foot swelling       Vitals:    21 1538   BP: 124/78   Pulse: 70   Resp: 15   Temp: 97.5 °F (36.4 °C)   TempSrc: Temporal   SpO2: 97%   Weight: 246 lb 9.6 oz (111.9 kg)   Height: 5' 5\" (1.651 m)   PainSc:   3   PainLoc: Foot   LMP: 2012       Hearing/Vision:   No exam data present    Learning Assessment:     Learning Assessment 10/8/2019   PRIMARY LEARNER Patient   HIGHEST LEVEL OF EDUCATION - PRIMARY LEARNER  -   BARRIERS PRIMARY LEARNER -   CO-LEARNER CAREGIVER -   PRIMARY LANGUAGE ENGLISH    NEED -   LEARNER PREFERENCE PRIMARY READING     DEMONSTRATION   ANSWERED BY patient   RELATIONSHIP SELF     Depression Screening:     3 most recent PHQ Screens 2021   Little interest or pleasure in doing things Several days   Feeling down, depressed, irritable, or hopeless Several days   Total Score PHQ 2 2   Trouble falling or staying asleep, or sleeping too much Not at all   Feeling tired or having little energy Several days   Poor appetite, weight loss, or overeating More than half the days   Feeling bad about yourself - or that you are a failure or have let yourself or your family down Several days   Trouble concentrating on things such as school, work, reading, or watching TV Not at all   Moving or speaking so slowly that other people could have noticed; or the opposite being so fidgety that others notice Not at all   Thoughts of being better off dead, or hurting yourself in some way Not at all   PHQ 9 Score 6     Fall Risk Assessment:     Fall Risk Assessment, last 12 mths 2021   Able to walk? Yes   Fall in past 12 months? 0   Do you feel unsteady? 0   Are you worried about falling 0   Number of falls in past 12 months -     Abuse Screening:     Abuse Screening Questionnaire 2021   Do you ever feel afraid of your partner?  N   Are you in a relationship with someone who physically or mentally threatens you? N   Is it safe for you to go home? Y     Coordination of Care Questionaire:   1. Have you been to the ER, urgent care clinic since your last visit? Hospitalized since your last visit? NO    2. Have you seen or consulted any other health care providers outside of the [address] [place] [address] since your last visit? Include any pap smears or colon screening. YES, dental for deep scaling    Advanced Directive:   1. Do you have an Advanced Directive? NO    2. Would you like information on Advanced Directives? YES    Per PCP no flu vaccine today.

## 2021-10-05 ENCOUNTER — CLINICAL SUPPORT (OUTPATIENT)
Dept: FAMILY MEDICINE CLINIC | Age: 61
End: 2021-10-05
Payer: COMMERCIAL

## 2021-10-05 VITALS — TEMPERATURE: 97.3 F

## 2021-10-05 DIAGNOSIS — Z23 NEEDS FLU SHOT: Primary | ICD-10-CM

## 2021-10-05 PROCEDURE — 90471 IMMUNIZATION ADMIN: CPT | Performed by: LEGAL MEDICINE

## 2021-10-05 PROCEDURE — 90686 IIV4 VACC NO PRSV 0.5 ML IM: CPT | Performed by: LEGAL MEDICINE

## 2021-10-05 NOTE — PROGRESS NOTES
Immunization Influenza administered 10/5/2021 by Lana Ahumada Verified by Law Daniel LPN. Patient tolerated procedure well. No reactions noted.

## 2021-10-19 DIAGNOSIS — F32.A DEPRESSIVE DISORDER: ICD-10-CM

## 2021-10-21 RX ORDER — SERTRALINE HYDROCHLORIDE 50 MG/1
TABLET, FILM COATED ORAL
Qty: 90 TABLET | Refills: 0 | Status: SHIPPED | OUTPATIENT
Start: 2021-10-21 | End: 2022-09-27

## 2022-03-19 PROBLEM — E66.01 OBESITY, MORBID (HCC): Status: ACTIVE | Noted: 2018-08-20

## 2022-03-19 PROBLEM — G47.30 SLEEP APNEA: Status: ACTIVE | Noted: 2019-07-09

## 2022-05-17 ENCOUNTER — TELEPHONE (OUTPATIENT)
Dept: FAMILY MEDICINE CLINIC | Age: 62
End: 2022-05-17

## 2022-05-17 DIAGNOSIS — N39.46 MIXED INCONTINENCE: Primary | ICD-10-CM

## 2022-05-17 NOTE — TELEPHONE ENCOUNTER
----- Message from Salima Bales sent at 5/17/2022 10:45 AM EDT -----  Subject: Referral Request    QUESTIONS   Reason for referral request? Patient would like a referral to a urologist.   Having bladder issues. Not being able to hold. \"wets\" on herself to much   to often. Patient was prescribed a medication a couple years ago by Dr. Terrence Seip but not working. Doesn't remember what the medication was. Has the physician seen you for this condition before? No   Preferred Specialist (if applicable)? Do you already have an appointment scheduled? Additional Information for Provider?   ---------------------------------------------------------------------------  --------------  CALL BACK INFO  What is the best way for the office to contact you? OK to leave message on   voicemail  Preferred Call Back Phone Number? [phone]  ---------------------------------------------------------------------------  --------------  SCRIPT ANSWERS  Relationship to Patient?  Self

## 2022-09-26 DIAGNOSIS — Z00.00 ANNUAL PHYSICAL EXAM: Primary | ICD-10-CM

## 2022-09-26 DIAGNOSIS — F32.A DEPRESSIVE DISORDER: ICD-10-CM

## 2022-09-26 DIAGNOSIS — Z13.1 SCREENING FOR DIABETES MELLITUS (DM): ICD-10-CM

## 2022-09-26 DIAGNOSIS — E53.8 VITAMIN B 12 DEFICIENCY: ICD-10-CM

## 2022-09-26 DIAGNOSIS — E55.9 VITAMIN D DEFICIENCY: ICD-10-CM

## 2022-09-27 RX ORDER — SERTRALINE HYDROCHLORIDE 50 MG/1
TABLET, FILM COATED ORAL
Qty: 90 TABLET | Refills: 0 | Status: SHIPPED | OUTPATIENT
Start: 2022-09-27

## 2022-09-30 ENCOUNTER — TELEPHONE (OUTPATIENT)
Dept: FAMILY MEDICINE CLINIC | Age: 62
End: 2022-09-30

## 2022-09-30 NOTE — TELEPHONE ENCOUNTER
----- Message from Swati 2 sent at 9/29/2022 12:48 PM EDT -----  Subject: Message to Provider    QUESTIONS  Information for Provider? Please call with lab hours and information  ---------------------------------------------------------------------------  --------------  Ky Fraction INFO  [phone]; OK to leave message on voicemail  ---------------------------------------------------------------------------  --------------  SCRIPT ANSWERS  Relationship to Patient?  Self

## 2022-10-13 ENCOUNTER — APPOINTMENT (OUTPATIENT)
Dept: FAMILY MEDICINE CLINIC | Age: 62
End: 2022-10-13

## 2022-10-13 DIAGNOSIS — E55.9 VITAMIN D DEFICIENCY: ICD-10-CM

## 2022-10-13 DIAGNOSIS — Z13.1 SCREENING FOR DIABETES MELLITUS (DM): ICD-10-CM

## 2022-10-13 DIAGNOSIS — Z00.00 ANNUAL PHYSICAL EXAM: ICD-10-CM

## 2022-10-13 DIAGNOSIS — E53.8 VITAMIN B 12 DEFICIENCY: ICD-10-CM

## 2022-10-13 LAB
25(OH)D3 SERPL-MCNC: 62.7 NG/ML (ref 32–100)
ABSOLUTE LYMPHOCYTE COUNT, 10803: 2.2 K/UL (ref 1–4.8)
ANION GAP SERPL CALC-SCNC: 11 MMOL/L (ref 3–15)
AVG GLU, 10930: 114 MG/DL (ref 91–123)
BASOPHILS # BLD: 0.1 K/UL (ref 0–0.2)
BASOPHILS NFR BLD: 1 % (ref 0–2)
BUN SERPL-MCNC: 16 MG/DL (ref 6–22)
CALCIUM SERPL-MCNC: 9.4 MG/DL (ref 8.4–10.5)
CHLORIDE SERPL-SCNC: 102 MMOL/L (ref 98–110)
CHOLEST SERPL-MCNC: 212 MG/DL (ref 110–200)
CO2 SERPL-SCNC: 28 MMOL/L (ref 20–32)
CREAT SERPL-MCNC: 0.8 MG/DL (ref 0.8–1.4)
EOSINOPHIL # BLD: 0.2 K/UL (ref 0–0.5)
EOSINOPHIL NFR BLD: 3 % (ref 0–6)
ERYTHROCYTE [DISTWIDTH] IN BLOOD BY AUTOMATED COUNT: 13.4 % (ref 10–15.5)
GLOMERULAR FILTRATION RATE: >60 ML/MIN/1.73 SQ.M.
GLUCOSE SERPL-MCNC: 97 MG/DL (ref 70–99)
GRANULOCYTES,GRANS: 57 % (ref 40–75)
HBA1C MFR BLD HPLC: 5.6 % (ref 4.8–5.6)
HCT VFR BLD AUTO: 46.6 % (ref 35.1–48)
HDLC SERPL-MCNC: 2.7 MG/DL (ref 0–5)
HDLC SERPL-MCNC: 78 MG/DL
HGB BLD-MCNC: 15.7 G/DL (ref 11.7–16)
LDL/HDL RATIO,LDHD: 1.5
LDLC SERPL CALC-MCNC: 114 MG/DL (ref 50–99)
LYMPHOCYTES, LYMLT: 32 % (ref 20–45)
MCH RBC QN AUTO: 34 PG (ref 26–34)
MCHC RBC AUTO-ENTMCNC: 34 G/DL (ref 31–36)
MCV RBC AUTO: 101 FL (ref 80–99)
MONOCYTES # BLD: 0.4 K/UL (ref 0.1–1)
MONOCYTES NFR BLD: 7 % (ref 3–12)
NEUTROPHILS # BLD AUTO: 3.9 K/UL (ref 1.8–7.7)
NON-HDL CHOLESTEROL, 011976: 134 MG/DL
PLATELET # BLD AUTO: 215 K/UL (ref 140–440)
PMV BLD AUTO: 11.5 FL (ref 9–13)
POTASSIUM SERPL-SCNC: 4.6 MMOL/L (ref 3.5–5.5)
RBC # BLD AUTO: 4.62 M/UL (ref 3.8–5.2)
SODIUM SERPL-SCNC: 141 MMOL/L (ref 133–145)
TRIGL SERPL-MCNC: 100 MG/DL (ref 40–149)
TSH SERPL DL<=0.005 MIU/L-ACNC: 2.51 MCU/ML (ref 0.27–4.2)
VIT B12 SERPL-MCNC: 892 PG/ML (ref 211–911)
VLDLC SERPL CALC-MCNC: 20 MG/DL (ref 8–30)
WBC # BLD AUTO: 6.8 K/UL (ref 4–11)

## 2022-10-20 ENCOUNTER — APPOINTMENT (OUTPATIENT)
Dept: FAMILY MEDICINE CLINIC | Age: 62
End: 2022-10-20

## 2022-10-20 ENCOUNTER — OFFICE VISIT (OUTPATIENT)
Dept: FAMILY MEDICINE CLINIC | Age: 62
End: 2022-10-20
Payer: COMMERCIAL

## 2022-10-20 VITALS
OXYGEN SATURATION: 96 % | SYSTOLIC BLOOD PRESSURE: 138 MMHG | HEIGHT: 65 IN | DIASTOLIC BLOOD PRESSURE: 86 MMHG | HEART RATE: 87 BPM | WEIGHT: 248.4 LBS | BODY MASS INDEX: 41.38 KG/M2 | RESPIRATION RATE: 18 BRPM | TEMPERATURE: 97.8 F

## 2022-10-20 DIAGNOSIS — E53.8 VITAMIN B 12 DEFICIENCY: ICD-10-CM

## 2022-10-20 DIAGNOSIS — M25.642 STIFFNESS OF JOINTS OF BOTH HANDS: ICD-10-CM

## 2022-10-20 DIAGNOSIS — M25.542 ARTHRALGIA OF BOTH HANDS: ICD-10-CM

## 2022-10-20 DIAGNOSIS — E66.01 CLASS 3 SEVERE OBESITY DUE TO EXCESS CALORIES WITH BODY MASS INDEX (BMI) OF 40.0 TO 44.9 IN ADULT, UNSPECIFIED WHETHER SERIOUS COMORBIDITY PRESENT (HCC): ICD-10-CM

## 2022-10-20 DIAGNOSIS — E55.9 VITAMIN D DEFICIENCY: ICD-10-CM

## 2022-10-20 DIAGNOSIS — N32.81 OVERACTIVE BLADDER: ICD-10-CM

## 2022-10-20 DIAGNOSIS — M25.641 STIFFNESS OF JOINTS OF BOTH HANDS: ICD-10-CM

## 2022-10-20 DIAGNOSIS — M25.541 ARTHRALGIA OF BOTH HANDS: ICD-10-CM

## 2022-10-20 DIAGNOSIS — Z00.00 ANNUAL PHYSICAL EXAM: ICD-10-CM

## 2022-10-20 DIAGNOSIS — Z00.00 ANNUAL PHYSICAL EXAM: Primary | ICD-10-CM

## 2022-10-20 PROCEDURE — 99396 PREV VISIT EST AGE 40-64: CPT | Performed by: LEGAL MEDICINE

## 2022-10-20 NOTE — PROGRESS NOTES
Flynn Oppenheim is a 65 y.o. female (: 1960) presenting to address:    Chief Complaint   Patient presents with    Medication Refill       Vitals:    10/20/22 0916   BP: (!) 156/94   Pulse: 87   Resp: 18   Temp: 97.8 °F (36.6 °C)   TempSrc: Temporal   SpO2: 96%   Weight: 248 lb 6.4 oz (112.7 kg)   Height: 5' 5\" (1.651 m)   PainSc:   0 - No pain   LMP: 2012       Hearing/Vision:   No results found. Learning Assessment:     Learning Assessment 10/8/2019   PRIMARY LEARNER Patient   HIGHEST LEVEL OF EDUCATION - PRIMARY LEARNER  -   BARRIERS PRIMARY LEARNER -   CO-LEARNER CAREGIVER -   PRIMARY LANGUAGE ENGLISH    NEED -   LEARNER PREFERENCE PRIMARY READING     DEMONSTRATION   ANSWERED BY patient   RELATIONSHIP SELF     Depression Screening:     3 most recent PHQ Screens 10/20/2022   Little interest or pleasure in doing things Not at all   Feeling down, depressed, irritable, or hopeless Not at all   Total Score PHQ 2 0   Trouble falling or staying asleep, or sleeping too much -   Feeling tired or having little energy -   Poor appetite, weight loss, or overeating -   Feeling bad about yourself - or that you are a failure or have let yourself or your family down -   Trouble concentrating on things such as school, work, reading, or watching TV -   Moving or speaking so slowly that other people could have noticed; or the opposite being so fidgety that others notice -   Thoughts of being better off dead, or hurting yourself in some way -   PHQ 9 Score -     Fall Risk Assessment:     Fall Risk Assessment, last 12 mths 10/20/2022   Able to walk? Yes   Fall in past 12 months? 0   Do you feel unsteady? 0   Are you worried about falling 0   Number of falls in past 12 months -     Abuse Screening:     Abuse Screening Questionnaire 10/20/2022   Do you ever feel afraid of your partner? N   Are you in a relationship with someone who physically or mentally threatens you? N   Is it safe for you to go home?  Yashira Quesada ADL Assessment:     ADL Assessment 10/8/2019   Feeding yourself No Help Needed   Getting from bed to chair No Help Needed   Getting dressed No Help Needed   Bathing or showering No Help Needed   Walk across the room (includes cane/walker) No Help Needed   Using the telphone No Help Needed   Taking your medications No Help Needed   Preparing meals No Help Needed   Managing money (expenses/bills) No Help Needed   Moderately strenuous housework (laundry) No Help Needed   Shopping for personal items (toiletries/medicines) No Help Needed   Shopping for groceries No Help Needed   Driving No Help Needed   Climbing a flight of stairs No Help Needed   Getting to places beyond walking distances No Help Needed        Coordination of Care Questionaire:   1. \"Have you been to the ER, urgent care clinic since your last visit? Hospitalized since your last visit? \" No    2. \"Have you seen or consulted any other health care providers outside of the [address] [place] [address] [place] since your last visit? \" Yes Where: Urology      3. For patients aged 39-70: Has the patient had a colonoscopy? Yes - no Care Gap present     If the patient is female:    4. For patients aged 41-77: Has the patient had a mammogram within the past 2 years? Yes - Care Gap present. Most recent result on file    5. For patients aged 21-65: Has the patient had a pap smear? Yes - no Care Gap present    Advanced Directive:   1. Do you have an Advanced Directive? NO    2. Would you like information on Advanced Directives?  NO

## 2022-10-20 NOTE — PROGRESS NOTES
Demian      Chief Complaint   Patient presents with    Follow-up     Vitals:    10/20/22 0916 10/20/22 0957   BP: (!) 156/94 138/86   Pulse: 87    Resp: 18    Temp: 97.8 °F (36.6 °C)    TempSrc: Temporal    SpO2: 96%    Weight: 248 lb 6.4 oz (112.7 kg)    Height: 5' 5\" (1.651 m)    PainSc:   0 - No pain    LMP: 2012         HPI::pateint is here for annual physical      She has no acute complaints  No smoking no excessive  drinking alcohol  3 glasses of wine      She has not loss much weight she is not exercising, and she loves sweet especially in the morning  Bilateral hands joint stiffness especially in the morning    Blood pressure is elevated patient has no history of hypertension, need to monitor with lifestyle decrease salt and monitor blood pressure at home    Past Medical History:   Diagnosis Date    Acute pain of right ear 2015    Depressive disorder, not elsewhere classified     Gynecologic exam normal     Dr. Brooklyn Napoles    H/O bone density study 2013    normal    Hip pain 2014    Osteophyte     bilat knees    Sleep apnea     TMJ (temporomandibular joint disorder)      Past Surgical History:   Procedure Laterality Date    HC MAMMO DIAG BILAT      11/10    HX CYSTECTOMY      L shoulder    HX ORTHOPAEDIC      lipoma removed     Social History     Tobacco Use    Smoking status: Never    Smokeless tobacco: Never   Substance Use Topics    Alcohol use: Yes     Comment: occasionally       Family History   Problem Relation Age of Onset    Thyroid Disease Mother     Pneumonia Mother     Dementia Father     Stroke Father     Diabetes Sister     Elevated Lipids Sister     Hypertension Sister     No Known Problems Brother     No Known Problems Brother     Heart Disease Maternal Grandmother             Heart Disease Maternal Aunt        Review of Systems   Constitutional:  Negative for chills, fever, malaise/fatigue and weight loss.    HENT:  Negative for congestion, ear discharge, ear pain, hearing loss, nosebleeds, sinus pain and sore throat. Eyes:  Negative for blurred vision, double vision and discharge. Respiratory:  Negative for cough, hemoptysis, sputum production, shortness of breath, wheezing and stridor. Cardiovascular:  Negative for chest pain, palpitations, claudication and leg swelling. Gastrointestinal:  Negative for abdominal pain, blood in stool, constipation, diarrhea, heartburn, melena, nausea and vomiting. Genitourinary:  Negative for dysuria, frequency and urgency. Musculoskeletal:  Positive for joint pain and myalgias. Negative for back pain, falls and neck pain. Skin:  Negative for itching and rash. Neurological:  Negative for dizziness, tingling, sensory change, speech change, focal weakness, weakness and headaches. Psychiatric/Behavioral:  Negative for depression, hallucinations, memory loss, substance abuse and suicidal ideas. The patient is not nervous/anxious and does not have insomnia. Physical Exam  Vitals and nursing note reviewed. Constitutional:       General: She is not in acute distress. Appearance: She is well-developed. She is obese. She is not ill-appearing, toxic-appearing or diaphoretic. HENT:      Head: Normocephalic and atraumatic. Right Ear: Tympanic membrane, ear canal and external ear normal. There is no impacted cerumen. Left Ear: Tympanic membrane, ear canal and external ear normal.      Mouth/Throat:      Pharynx: No oropharyngeal exudate. Eyes:      General: No scleral icterus. Right eye: No discharge. Left eye: No discharge. Conjunctiva/sclera: Conjunctivae normal.      Pupils: Pupils are equal, round, and reactive to light. Neck:      Thyroid: No thyromegaly. Cardiovascular:      Rate and Rhythm: Normal rate and regular rhythm. Heart sounds: Normal heart sounds. No murmur heard. Pulmonary:      Effort: Pulmonary effort is normal. No respiratory distress.       Breath sounds: Normal breath sounds. No wheezing or rales. Chest:      Chest wall: No tenderness. Abdominal:      General: There is no distension. Palpations: Abdomen is soft. There is no mass. Tenderness: no abdominal tenderness There is no right CVA tenderness, guarding or rebound. Hernia: No hernia is present. Musculoskeletal:         General: Tenderness present. No deformity. Normal range of motion. Lymphadenopathy:      Cervical: No cervical adenopathy. Skin:     General: Skin is warm and dry. Coloration: Skin is not jaundiced or pale. Findings: No bruising, erythema, lesion or rash. Neurological:      Mental Status: She is alert and oriented to person, place, and time. Cranial Nerves: No cranial nerve deficit. Sensory: No sensory deficit. Motor: No weakness. Coordination: Coordination normal.      Gait: Gait normal.      Deep Tendon Reflexes: Reflexes normal.   Psychiatric:         Mood and Affect: Mood normal.         Behavior: Behavior normal.         Thought Content: Thought content normal.         Judgment: Judgment normal.        Assessment and plan     Plan of care has been discussed with the patient, he agrees to the plan and verbalized understanding. All his questions were answered  More than 50% of the time spent in this visit was counseling the patient about  illness and treatment options         1. Annual physical exam    - HEPATIC FUNCTION PANEL; Future    2. Vitamin B 12 deficiency    Vitamin B12 is within normal limits  3. Vitamin D deficiency  Within normal limits    4. Overactive bladder  \She is following with urologist and she is taking trospium    5. Arthralgia of both hands    - C REACTIVE PROTEIN, QT; Future  - BREEZY COMPREHENSIVE PLUS PANEL; Future    6. Stiffness of joints of both hands    - C REACTIVE PROTEIN, QT; Future  - BREEZY COMPREHENSIVE PLUS PANEL; Future    7.  Class 3 severe obesity due to excess calories with body mass index (BMI) of 40.0 to 44.9 in adult, unspecified whether serious comorbidity present ([facility] [address]  Lifestyle modification has been discussed with patient in details, decrease carbohydrates, decrease or eliminate sugar intake, gradually increase physical activity as tolerated to be about 4 to 5 hours a week. Current Outpatient Medications   Medication Sig Dispense Refill    docosahexaenoic acid/epa (FISH OIL PO) Take  by mouth daily. estradioL (ESTRACE) 0.01 % (0.1 mg/gram) vaginal cream Apply 1g vaginally daily for 2 weeks then decrease to three times a week. Apply with fingertip - do not use applicator. 42.5 g 4    sertraline (ZOLOFT) 50 mg tablet Take 1 tablet by mouth once daily for 90 days 90 Tablet 0    glucosamine sulfate dipotassium-chondroitin sulfate 500-400 mg tablet Take 1 Tablet by mouth three (3) times daily. trospium (SANCTURA XL) 60 mg capsule Take 1 Capsule by mouth Daily (before breakfast). 90 Capsule 3    meloxicam (MOBIC) 7.5 mg tablet 7.5 mg.      cholecalciferol, vitamin D3, 50 mcg (2,000 unit) tab Take 2,000 Units by mouth daily. calcium carbonate (CALCIUM 600 PO) Take 2 Caps by mouth daily. Glucosamine HCl 500 mg Tab Take 1 tablet by mouth daily. MULTIVITAMIN PO Take 1 tablet by mouth daily. vibegron (GEMTESA) 75 mg tablet Take 1 Tablet by mouth daily.  (Patient not taking: Reported on 10/20/2022) 90 Tablet 1       Patient Active Problem List    Diagnosis Date Noted    Sleep apnea 07/09/2019    Obesity, morbid ([facility] 08/20/2018    Heel spur 11/17/2014    Vaginal dryness 08/09/2014    TMJ (temporomandibular joint disorder)     Depressive disorder      Results for orders placed or performed in visit on 10/13/22   VITAMIN B12   Result Value Ref Range    Vitamin B12 892 211 - 911 pg/mL   VITAMIN D, 25 HYDROXY   Result Value Ref Range    VITAMIN D, 25-HYDROXY 62.7 32.0 - 100.0 ng/mL   TSH 3RD GENERATION   Result Value Ref Range    TSH 2.51 0.27 - 4.20 mcU/mL   LIPID PANEL   Result Value Ref Range Triglyceride 100 40 - 149 mg/dL    HDL Cholesterol 78 >=40 mg/dL    Cholesterol, total 212 (H) 110 - 200 mg/dL    CHOLESTEROL/HDL 2.7 0.0 - 5.0    Non-HDL Cholesterol 134 (H) <130 mg/dL    LDL, calculated 114 (H) 50 - 99 mg/dL    VLDL, calculated 20 8 - 30 mg/dL    LDL/HDL Ratio 1.5    METABOLIC PANEL, BASIC   Result Value Ref Range    Glucose 97 70 - 99 mg/dL    BUN 16 6 - 22 mg/dL    Creatinine 0.8 0.8 - 1.4 mg/dL    Sodium 141 133 - 145 mmol/L    Potassium 4.6 3.5 - 5.5 mmol/L    Chloride 102 98 - 110 mmol/L    CO2 28 20 - 32 mmol/L    Calcium 9.4 8.4 - 10.5 mg/dL    GLOMERULAR FILTRATION RATE >60.0 >60.0 mL/min/1.73 sq.m. Anion gap 11.0 3.0 - 15.0 mmol/L   CBC WITH AUTOMATED DIFF   Result Value Ref Range    WBC 6.8 4.0 - 11.0 K/uL    RBC 4.62 3.80 - 5.20 M/uL    HGB 15.7 11.7 - 16.0 g/dL    HCT 46.6 35.1 - 48.0 %     (H) 80 - 99 fL    MCH 34 26 - 34 pg    MCHC 34 31 - 36 g/dL    RDW 13.4 10.0 - 15.5 %    PLATELET 936 406 - 715 K/uL    MPV 11.5 9.0 - 13.0 fL    NEUTROPHILS 57 40 - 75 %    Lymphocytes 32 20 - 45 %    MONOCYTES 7 3 - 12 %    EOSINOPHILS 3 0 - 6 %    BASOPHILS 1 0 - 2 %    ABS. NEUTROPHILS 3.9 1.8 - 7.7 K/uL    ABSOLUTE LYMPHOCYTE COUNT 2.2 1.0 - 4.8 K/uL    ABS. MONOCYTES 0.4 0.1 - 1.0 K/uL    ABS. EOSINOPHILS 0.2 0.0 - 0.5 K/uL    ABS.  BASOPHILS 0.1 0.0 - 0.2 K/uL     Appointment on 10/13/2022   Component Date Value Ref Range Status    Vitamin B12 10/13/2022 892  211 - 911 pg/mL Final    VITAMIN D, 25-HYDROXY 10/13/2022 62.7  32.0 - 100.0 ng/mL Final    TSH 10/13/2022 2.51  0.27 - 4.20 mcU/mL Final    Triglyceride 10/13/2022 100  40 - 149 mg/dL Final    HDL Cholesterol 10/13/2022 78  >=40 mg/dL Final    Cholesterol, total 10/13/2022 212 (A)  110 - 200 mg/dL Final    CHOLESTEROL/HDL 10/13/2022 2.7  0.0 - 5.0 Final    Non-HDL Cholesterol 10/13/2022 134 (A)  <130 mg/dL Final    LDL, calculated 10/13/2022 114 (A)  50 - 99 mg/dL Final    VLDL, calculated 10/13/2022 20  8 - 30 mg/dL Final LDL/HDL Ratio 10/13/2022 1.5   Final    Comment: Test includes cholesterol, HDL cholesterol, triglycerides and LDL. Cholesterol Recommended NCEP guidelines in mg/dL:    Less than 200            Desirable  200 - 239                Borderline High  Greater than or  = 240   High      Please Note:  Total Chol/HDL Ratio                     Men     Women  1/2 Avg. Risk    3.4     3.3      Avg. Risk    5.0     4.4  2X  Avg. Risk    9.6     7.1  3X  Avg. Risk   23.4    11.0            Glucose 10/13/2022 97  70 - 99 mg/dL Final    BUN 10/13/2022 16  6 - 22 mg/dL Final    Creatinine 10/13/2022 0.8  0.8 - 1.4 mg/dL Final    Sodium 10/13/2022 141  133 - 145 mmol/L Final    Potassium 10/13/2022 4.6  3.5 - 5.5 mmol/L Final    Chloride 10/13/2022 102  98 - 110 mmol/L Final    CO2 10/13/2022 28  20 - 32 mmol/L Final    Calcium 10/13/2022 9.4  8.4 - 10.5 mg/dL Final    GLOMERULAR FILTRATION RATE 10/13/2022 >60.0  >60.0 mL/min/1.73 sq.m. Final    Comment: eGFR calculation based on the Chronic Kidney Disease Epidemiology   Collaboration  (CKD-EPI) equation refit without adjustment for race. This eGFR is validated for stable chronic renal failure patients. This   equation  is unreliable in acute illness or patients with normal renal function. Anion gap 10/13/2022 11.0  3.0 - 15.0 mmol/L Final    Comment: Anion Gap calculation based on electrolyte reference ranges. Test includes BUN, CO2, Chloride, Creatinine, Glucose, Potassium, Calcium and  Sodium.         WBC 10/13/2022 6.8  4.0 - 11.0 K/uL Final    RBC 10/13/2022 4.62  3.80 - 5.20 M/uL Final    HGB 10/13/2022 15.7  11.7 - 16.0 g/dL Final    HCT 10/13/2022 46.6  35.1 - 48.0 % Final    MCV 10/13/2022 101 (A)  80 - 99 fL Final    MCH 10/13/2022 34  26 - 34 pg Final    MCHC 10/13/2022 34  31 - 36 g/dL Final    RDW 10/13/2022 13.4  10.0 - 15.5 % Final    PLATELET 20/06/0430 929  140 - 440 K/uL Final    MPV 10/13/2022 11.5  9.0 - 13.0 fL Final    NEUTROPHILS 10/13/2022 57  40 - 75 % Final    Lymphocytes 10/13/2022 32  20 - 45 % Final    MONOCYTES 10/13/2022 7  3 - 12 % Final    EOSINOPHILS 10/13/2022 3  0 - 6 % Final    BASOPHILS 10/13/2022 1  0 - 2 % Final    ABS. NEUTROPHILS 10/13/2022 3.9  1.8 - 7.7 K/uL Final    ABSOLUTE LYMPHOCYTE COUNT 10/13/2022 2.2  1.0 - 4.8 K/uL Final    ABS. MONOCYTES 10/13/2022 0.4  0.1 - 1.0 K/uL Final    ABS. EOSINOPHILS 10/13/2022 0.2  0.0 - 0.5 K/uL Final    ABS. BASOPHILS 10/13/2022 0.1  0.0 - 0.2 K/uL Final   Telephone on 09/30/2022   Component Date Value Ref Range Status    Hemoglobin A1c 10/13/2022 5.6  4.8 - 5.6 % Final    AVG GLU 10/13/2022 114  91 - 123 mg/dL Final    Comment: 5.7 - 6.4% is indicative of prediabetes. > 6.4% is indicative of diabetes.          Office Visit on 09/29/2022   Component Date Value Ref Range Status    Color (UA POC) 09/29/2022 Yellow   Final    Clarity (UA POC) 09/29/2022 Clear   Final    Glucose (UA POC) 09/29/2022 Negative  Negative Final    Bilirubin (UA POC) 09/29/2022 Negative  Negative Final    Ketones (UA POC) 09/29/2022 Negative  Negative Final    Specific gravity (UA POC) 09/29/2022 1.030  1.001 - 1.035 Final    Blood (UA POC) 09/29/2022 Negative  Negative Final    pH (UA POC) 09/29/2022 5.5  4.6 - 8.0 Final    Protein (UA POC) 09/29/2022 Negative  Negative Final    Urobilinogen (UA POC) 09/29/2022 0.2 mg/dL  0.2 - 1 Final    Nitrites (UA POC) 09/29/2022 Negative  Negative Final    Leukocyte esterase (UA POC) 09/29/2022 Negative  Negative Final    PVR 09/29/2022 0  cc Final   Office Visit on 08/04/2022   Component Date Value Ref Range Status    Color (UA POC) 08/04/2022 Yellow   Final    Clarity (UA POC) 08/04/2022 Clear   Final    Glucose (UA POC) 08/04/2022 Negative  Negative Final    Bilirubin (UA POC) 08/04/2022 Negative  Negative Final    Ketones (UA POC) 08/04/2022 Negative  Negative Final    Specific gravity (UA POC) 08/04/2022 1.025  1.001 - 1.035 Final    Blood (UA POC) 08/04/2022 Negative  Negative Final    pH (UA POC) 08/04/2022 6.0  4.6 - 8.0 Final    Protein (UA POC) 08/04/2022 Negative  Negative Final    Urobilinogen (UA POC) 08/04/2022 0.2 mg/dL  0.2 - 1 Final    Nitrites (UA POC) 08/04/2022 Negative  Negative Final    Leukocyte esterase (UA POC) 08/04/2022 1+  Negative Final    PVR 08/04/2022 0  cc Final

## 2022-10-21 LAB
A-G RATIO,AGRAT: 1.8 RATIO (ref 1.1–2.6)
ALBUMIN SERPL-MCNC: 4.6 G/DL (ref 3.5–5)
ALP SERPL-CCNC: 84 U/L (ref 40–120)
ALT SERPL-CCNC: 23 U/L (ref 5–40)
ANTI-DNA (DS) AB QN, 1189: 1 IU/ML
AST SERPL W P-5'-P-CCNC: 16 U/L (ref 10–37)
BILIRUB SERPL-MCNC: 0.7 MG/DL (ref 0.2–1.2)
BILIRUBIN, DIRECT,CBIL: <0.2 MG/DL (ref 0–0.3)
C-REACTIVE PROTEIN, QT, 006627: <0.5 MG/DL
CENTROMERE B ANTIBODY, 601143: <0.2 AI
CHROMATIN ANTIBODY: <0.2 AI
ENA SS-A AB SER-ACNC: <0.2 AI
ENA SS-B AB SER-ACNC: <0.2 AI
GLOBULIN,GLOB: 2.5 G/DL (ref 2–4)
JO1 ANTIBODY, 8107: <0.2 AI
PROT SERPL-MCNC: 7.1 G/DL (ref 6.2–8.1)
RNP ABS, 016354: 0.9 AI
SCLERODERMA AB (SCL-70), 601116: <0.2 AI
SMITH ABS, 016362: <0.2 AI

## 2022-12-08 ENCOUNTER — TELEPHONE (OUTPATIENT)
Dept: FAMILY MEDICINE CLINIC | Age: 62
End: 2022-12-08

## 2022-12-08 ENCOUNTER — VIRTUAL VISIT (OUTPATIENT)
Dept: FAMILY MEDICINE CLINIC | Age: 62
End: 2022-12-08
Payer: COMMERCIAL

## 2022-12-08 DIAGNOSIS — J06.9 VIRAL UPPER RESPIRATORY TRACT INFECTION WITH COUGH: Primary | ICD-10-CM

## 2022-12-08 PROCEDURE — 99213 OFFICE O/P EST LOW 20 MIN: CPT | Performed by: FAMILY MEDICINE

## 2022-12-08 NOTE — PATIENT INSTRUCTIONS
1. Viral upper respiratory tract infection with cough  Recommend maintaining hydration, OTC cough syrup as needed  I advised her to contact the office if her condition worsens, changes, fails to improve as anticipated and with any new problems. She expressed understanding with the diagnosis(es) and plan.

## 2022-12-08 NOTE — TELEPHONE ENCOUNTER
Spoke to patient and have her scheduled for today for a VV w/ Dr Kody Vickers.      Future Appointments   Date Time Provider Michael Maral   12/8/2022  3:00 PM Bela Carlos MD [facility]

## 2022-12-08 NOTE — PROGRESS NOTES
Cindy Godwin is a 65 y.o. female who was seen by synchronous (real-time) audio-video technology using doxy. me on 12/8/2022. Mr#: [phone]    Consent:  She and/or her healthcare decision maker is aware that this patient-initiated Telehealth encounter is a billable service, with coverage as determined by her insurance carrier. She is aware that she may receive a bill and has provided verbal consent to proceed: YES    I was in the office while conducting this encounter. 712  HPI/Subjective:     Ms. Elen Magana reports the onset of congestion and cough about a week ago and initially also felt somewhat weak. She has not experienced fever or chills, myalgias, headache, gastrointestinal symptoms or any respiratory difficulty. Her cough has been minimally if at all productive. She has not tested for COVID-19 but reports that she has been immunized. She indicates that she has begun to feel better significantly since yesterday. She has taken over-the-counter cough syrup which does help her symptoms when she takes it. Patient Active Problem List   Diagnosis Code    TMJ (temporomandibular joint disorder) M26.609    Depressive disorder F32. A    Vaginal dryness N89.8    Heel spur M77.30    Obesity, morbid (HCC) E66.01    Sleep apnea G47.30            No Known Allergies      Current Outpatient Medications:     docosahexaenoic acid/epa (FISH OIL PO), Take  by mouth daily. , Disp: , Rfl:     sertraline (ZOLOFT) 50 mg tablet, Take 1 tablet by mouth once daily for 90 days, Disp: 90 Tablet, Rfl: 0    glucosamine sulfate dipotassium-chondroitin sulfate 500-400 mg tablet, Take 1 Tablet by mouth two (2) times a day., Disp: , Rfl:     meloxicam (MOBIC) 7.5 mg tablet, 7.5 mg., Disp: , Rfl:     cholecalciferol, vitamin D3, 50 mcg (2,000 unit) tab, Take 2,000 Units by mouth daily. , Disp: , Rfl:     calcium carbonate (CALCIUM 600 PO), Take 2 Caps by mouth daily. , Disp: , Rfl:     Glucosamine HCl 500 mg Tab, Take 1 tablet by mouth daily. , Disp: , Rfl:     MULTIVITAMIN PO, Take 1 tablet by mouth daily. , Disp: , Rfl:       Review of Systems   Constitutional:  Negative for fever. HENT:  Positive for congestion. Respiratory:  Positive for cough. Gastrointestinal:  Negative for diarrhea, nausea and vomiting. Musculoskeletal:  Negative for myalgias. Neurological:  Negative for headaches. PHYSICAL EXAMINATION:    Constitutional: [x] Appears well-developed and well-nourished [x] No apparent distress        Mental status: [x] Alert and awake  [x] Oriented t [x] Able to follow commands      Eyes:   EOM    [x]  Normal        HENT: [x] Normocephalic,      Pulmonary/Chest: [x] Respiratory effort normal   [x] No visualized signs of difficulty breathing or respiratory distress           Musculoskeletal:   [x] No obvious deformities noted with regard to areas viewed           Neurological:        [x] No apparent neurologic deficits noted in areas viewed                 Skin:        [x] No apparent dermatologic abnormalities noted in areas viewed               Psychiatric:       [x] Normal Affect      Other pertinent observable physical exam findings:-None noted          Assessment & Plan:   Diagnoses and all orders for this visit:    1. Viral upper respiratory tract infection with cough  Recommend maintaining hydration, OTC cough syrup as needed  I advised her to contact the office if her condition worsens, changes, fails to improve as anticipated and with any new problems. She expressed understanding with the diagnosis(es) and plan.           [facility] the patient is at home and the provider is at [facility] and [address] to the emergency declaration under the [address] [place] [address], [facility] and the [employer] Act, this Virtual  Visit was conducted, with patient's consent, to reduce the patient's risk of exposure to COVID-19 and provide continuity of care for an established patient. Services were provided through a video synchronous discussion virtually to substitute for in-person clinic visit. Paulina Price MD         PLEASE NOTE:   This document has been produced using voice recognition software. Unrecognized errors in transcription may be present.

## 2022-12-08 NOTE — TELEPHONE ENCOUNTER
----- Message from John Paul Rizzo sent at 12/7/2022  9:31 AM EST -----  Subject: Appointment Request    Reason for Call: Established Patient Appointment needed: Semi-Routine   Cough, Cold Symptoms    QUESTIONS    Reason for appointment request? Available appointments did not meet   patient need     Additional Information for Provider? pt need a call back regarding   congestion and coughing for apt to be seen.    ---------------------------------------------------------------------------  --------------  Neo HERNANDEZ  [phone]; OK to leave message on voicemail  ---------------------------------------------------------------------------  --------------  SCRIPT ANSWERS  COVID Screen: Red

## 2022-12-08 NOTE — PROGRESS NOTES
For virtual visit patient would like to receive [email or web address]: Reina Zepeda is a 65 y.o. female (: 1960) evaluated via telephone on 2022 to address:    Chief Complaint   Patient presents with    Cold Symptoms     Congestion in ears and nose and coughing since        No flowsheet data found. Allergies Reviewed. Learning Assessment:     Learning Assessment 10/8/2019   PRIMARY LEARNER Patient   HIGHEST LEVEL OF EDUCATION - PRIMARY LEARNER  -   BARRIERS PRIMARY LEARNER -   CO-LEARNER CAREGIVER -   PRIMARY LANGUAGE ENGLISH    NEED -   LEARNER PREFERENCE PRIMARY READING     DEMONSTRATION   ANSWERED BY patient   RELATIONSHIP SELF     Depression Screening:     3 most recent PHQ Screens 2022   Little interest or pleasure in doing things Not at all   Feeling down, depressed, irritable, or hopeless Not at all   Total Score PHQ 2 0   Trouble falling or staying asleep, or sleeping too much -   Feeling tired or having little energy -   Poor appetite, weight loss, or overeating -   Feeling bad about yourself - or that you are a failure or have let yourself or your family down -   Trouble concentrating on things such as school, work, reading, or watching TV -   Moving or speaking so slowly that other people could have noticed; or the opposite being so fidgety that others notice -   Thoughts of being better off dead, or hurting yourself in some way -   PHQ 9 Score -     Fall Risk Assessment:     Fall Risk Assessment, last 12 mths 2022   Able to walk? Yes   Fall in past 12 months? 0   Do you feel unsteady? 0   Are you worried about falling 0   Number of falls in past 12 months -     Abuse Screening:     Abuse Screening Questionnaire 2022   Do you ever feel afraid of your partner? N   Are you in a relationship with someone who physically or mentally threatens you? N   Is it safe for you to go home?  Y     ADL Assessment:     ADL Assessment 10/8/2019   Feeding yourself No Help Needed   Getting from bed to chair No Help Needed   Getting dressed No Help Needed   Bathing or showering No Help Needed   Walk across the room (includes cane/walker) No Help Needed   Using the telphone No Help Needed   Taking your medications No Help Needed   Preparing meals No Help Needed   Managing money (expenses/bills) No Help Needed   Moderately strenuous housework (laundry) No Help Needed   Shopping for personal items (toiletries/medicines) No Help Needed   Shopping for groceries No Help Needed   Driving No Help Needed   Climbing a flight of stairs No Help Needed   Getting to places beyond walking distances No Help Needed        Coordination of Care Questionaire:   1. Have you been to the ER, urgent care clinic since your last visit? Hospitalized since your last visit? NO    2. Have you seen or consulted any other health care providers outside of the [address] [place] [address] [place] since your last visit? Include any pap smears or colon screening. NO    Advanced Directive:   1. Do you have an Advanced Directive? NO    2. Would you like information on Advanced Directives?  NO

## 2023-01-10 DIAGNOSIS — F32.A DEPRESSIVE DISORDER: ICD-10-CM

## 2023-01-10 RX ORDER — SERTRALINE HYDROCHLORIDE 50 MG/1
TABLET, FILM COATED ORAL
Qty: 90 TABLET | Refills: 0 | Status: SHIPPED | OUTPATIENT
Start: 2023-01-10

## 2023-03-30 DIAGNOSIS — F32.9 MAJOR DEPRESSIVE DISORDER WITH SINGLE EPISODE, REMISSION STATUS UNSPECIFIED: Primary | ICD-10-CM

## 2023-06-26 DIAGNOSIS — F32.9 MAJOR DEPRESSIVE DISORDER WITH SINGLE EPISODE, REMISSION STATUS UNSPECIFIED: ICD-10-CM

## 2024-09-20 ENCOUNTER — OFFICE VISIT (OUTPATIENT)
Age: 64
End: 2024-09-20
Payer: COMMERCIAL

## 2024-09-20 ENCOUNTER — HOSPITAL ENCOUNTER (OUTPATIENT)
Facility: HOSPITAL | Age: 64
Discharge: HOME | End: 2024-09-23
Payer: COMMERCIAL

## 2024-09-20 VITALS — BODY MASS INDEX: 33.11 KG/M2 | HEIGHT: 66 IN | WEIGHT: 206 LBS

## 2024-09-20 DIAGNOSIS — E66.01 OBESITY, MORBID: ICD-10-CM

## 2024-09-20 DIAGNOSIS — M25.562 CHRONIC PAIN OF LEFT KNEE: ICD-10-CM

## 2024-09-20 DIAGNOSIS — G89.29 CHRONIC PAIN OF LEFT KNEE: ICD-10-CM

## 2024-09-20 DIAGNOSIS — M17.12 PRIMARY OSTEOARTHRITIS OF LEFT KNEE: ICD-10-CM

## 2024-09-20 DIAGNOSIS — M25.562 CHRONIC PAIN OF LEFT KNEE: Primary | ICD-10-CM

## 2024-09-20 DIAGNOSIS — G89.29 CHRONIC PAIN OF LEFT KNEE: Primary | ICD-10-CM

## 2024-09-20 LAB
ANION GAP SERPL CALC-SCNC: 6 MMOL/L (ref 3–18)
BUN SERPL-MCNC: 21 MG/DL (ref 7–18)
BUN/CREAT SERPL: 25 (ref 12–20)
CALCIUM SERPL-MCNC: 9.5 MG/DL (ref 8.5–10.1)
CHLORIDE SERPL-SCNC: 103 MMOL/L (ref 100–111)
CO2 SERPL-SCNC: 28 MMOL/L (ref 21–32)
CREAT SERPL-MCNC: 0.84 MG/DL (ref 0.6–1.3)
ERYTHROCYTE [DISTWIDTH] IN BLOOD BY AUTOMATED COUNT: 12.9 % (ref 11.6–14.5)
GLUCOSE SERPL-MCNC: 91 MG/DL (ref 74–99)
HCT VFR BLD AUTO: 45.8 % (ref 35–45)
HGB BLD-MCNC: 15.1 G/DL (ref 12–16)
MCH RBC QN AUTO: 32.2 PG (ref 24–34)
MCHC RBC AUTO-ENTMCNC: 33 G/DL (ref 31–37)
MCV RBC AUTO: 97.7 FL (ref 78–100)
NRBC # BLD: 0 K/UL (ref 0–0.01)
NRBC BLD-RTO: 0 PER 100 WBC
PLATELET # BLD AUTO: 209 K/UL (ref 135–420)
PMV BLD AUTO: 11.3 FL (ref 9.2–11.8)
POTASSIUM SERPL-SCNC: 4.3 MMOL/L (ref 3.5–5.5)
RBC # BLD AUTO: 4.69 M/UL (ref 4.2–5.3)
SODIUM SERPL-SCNC: 137 MMOL/L (ref 136–145)
WBC # BLD AUTO: 7.1 K/UL (ref 4.6–13.2)

## 2024-09-20 PROCEDURE — 36415 COLL VENOUS BLD VENIPUNCTURE: CPT

## 2024-09-20 PROCEDURE — 85027 COMPLETE CBC AUTOMATED: CPT

## 2024-09-20 PROCEDURE — 80048 BASIC METABOLIC PNL TOTAL CA: CPT

## 2024-09-20 PROCEDURE — 99204 OFFICE O/P NEW MOD 45 MIN: CPT | Performed by: ORTHOPAEDIC SURGERY

## 2024-09-20 PROCEDURE — 93005 ELECTROCARDIOGRAM TRACING: CPT

## 2024-09-20 PROCEDURE — 71046 X-RAY EXAM CHEST 2 VIEWS: CPT

## 2024-09-21 LAB
BACTERIA SPEC CULT: NORMAL
BACTERIA SPEC CULT: NORMAL
EKG ATRIAL RATE: 63 BPM
EKG DIAGNOSIS: NORMAL
EKG P AXIS: 45 DEGREES
EKG P-R INTERVAL: 172 MS
EKG Q-T INTERVAL: 426 MS
EKG QRS DURATION: 92 MS
EKG QTC CALCULATION (BAZETT): 435 MS
EKG R AXIS: 77 DEGREES
EKG T AXIS: 52 DEGREES
EKG VENTRICULAR RATE: 63 BPM
SERVICE CMNT-IMP: NORMAL

## 2024-09-21 PROCEDURE — 93010 ELECTROCARDIOGRAM REPORT: CPT | Performed by: INTERNAL MEDICINE

## 2024-10-15 DIAGNOSIS — Z96.652 STATUS POST TOTAL LEFT KNEE REPLACEMENT: Primary | ICD-10-CM

## 2024-10-17 ENCOUNTER — OFFICE VISIT (OUTPATIENT)
Age: 64
End: 2024-10-17
Payer: COMMERCIAL

## 2024-10-17 DIAGNOSIS — F32.A DEPRESSIVE DISORDER: Primary | ICD-10-CM

## 2024-10-17 DIAGNOSIS — M17.12 PRIMARY OSTEOARTHRITIS OF LEFT KNEE: ICD-10-CM

## 2024-10-17 PROCEDURE — 99214 OFFICE O/P EST MOD 30 MIN: CPT | Performed by: ORTHOPAEDIC SURGERY

## 2024-10-17 RX ORDER — ONDANSETRON 8 MG/1
8 TABLET, ORALLY DISINTEGRATING ORAL EVERY 8 HOURS PRN
Qty: 20 TABLET | Refills: 0 | Status: SHIPPED | OUTPATIENT
Start: 2024-10-17 | End: 2024-10-24

## 2024-10-17 RX ORDER — ASPIRIN 325 MG
325 TABLET, DELAYED RELEASE (ENTERIC COATED) ORAL 2 TIMES DAILY
Qty: 60 TABLET | Refills: 0 | Status: SHIPPED | OUTPATIENT
Start: 2024-10-17 | End: 2024-11-16

## 2024-10-17 RX ORDER — OXYCODONE AND ACETAMINOPHEN 5; 325 MG/1; MG/1
1 TABLET ORAL
Qty: 30 TABLET | Refills: 0 | Status: SHIPPED | OUTPATIENT
Start: 2024-10-17 | End: 2024-10-25

## 2024-10-17 RX ORDER — CEPHALEXIN 500 MG/1
500 CAPSULE ORAL EVERY 8 HOURS
Qty: 21 CAPSULE | Refills: 0 | Status: SHIPPED | OUTPATIENT
Start: 2024-10-17 | End: 2024-10-24

## 2024-11-05 ENCOUNTER — TELEMEDICINE (OUTPATIENT)
Age: 64
End: 2024-11-05

## 2024-11-05 DIAGNOSIS — M25.562 LEFT KNEE PAIN, UNSPECIFIED CHRONICITY: ICD-10-CM

## 2024-11-05 DIAGNOSIS — Z96.652 STATUS POST TOTAL KNEE REPLACEMENT, LEFT: Primary | ICD-10-CM

## 2024-11-05 PROCEDURE — 99024 POSTOP FOLLOW-UP VISIT: CPT

## 2024-11-05 NOTE — PATIENT INSTRUCTIONS
Post Operative Total Knee Replacement Instructions    PLEASE REMOVE YOUR LONG WHITE BANDAGE & STOCKING PRIOR TO CONNECTING TO YOUR APPOINTMENT       During your recovery from a total knee replacement, you will be participating in an OUTPATIENT physical therapy program. Your goal is to progress from a walker to a cane to nothing at all while walking, if possible, over the next 2 weeks.     You can now shower and get your incision wet, pat it dry afterwards. No further dressing changes will be required as long as there is no drainage.  You may not submerge the leg in a bath, pool, hot tub or other body of water such as a lake until at least 6 weeks post surgery as long as there is no drainage from your incision, open areas along the incision, or areas of concern.    You may drive if you are not using any assistive devices to walk and are not using any narcotic pain medication.     You may discontinue your aspirin (if that is your primary blood thinner prescribed by Dr. Cuadra ) when you are at least 4 weeks out from surgery AND are no longer using a cane or walker.  If you are still using assistive devices, please DO NOT stop the aspirin until you are completely off them.  If you are on other blood thinners prescribed by another doctor please continue that until you are instructed to discontinue them.    You and your physical therapist will determine when to stop your physical therapy program.    Narcotic pain medication can cause constipation.  You may take over the counter stool softeners such as Docusate Sodium or Miralax 1-2 times per day to assist with the constipation.  Ensure you are taking in plenty of fluids and fiber as well.    If you require a refill on a narcotic pain medication, please let Dr. Cuadra or his Nurse Practitioner know at your appointment today or AT LEAST 48 hours prior to needing it. No refills will be provided after hours or during weekends.    If you experience any significant calf pain,

## 2024-11-05 NOTE — PROGRESS NOTES
Marisela Jimenez (:  1960) is a Established patient, evaluated via audio/visual telemedicine on 2024    [facility]  [address], [address]  [place] [address]  Dept: [phone]  Dept Fax: [phone]   Chief Complaint   Patient presents with    Post-Op Check    Knee Pain     LTKR     Patient-Reported Vitals  No data recorded   No Known Allergies  Current Outpatient Medications   Medication Sig Dispense Refill    aspirin 325 MG EC tablet Take 1 tablet by mouth in the morning and at bedtime DO NOT START TAKING UNTIL AFTER SURGERY!!  enteric coated aspirin - ONLY 60 tablet 0    darifenacin (ENABLEX) 15 MG extended release tablet Take 1 tablet by mouth daily 30 tablet 3    vibegron (GEMTESA) 75 MG TABS tablet Take 1 tablet by mouth daily 90 tablet 3    sertraline (ZOLOFT) 50 MG tablet Take 1 tablet by mouth once daily 90 tablet 0    Multiple Vitamin (MULTIVITAMIN PO) Take 1 tablet by mouth daily      Cholecalciferol 50 MCG (2000 UT) TABS Take 2,000 Units by mouth daily       No current facility-administered medications for this visit.      Patient Active Problem List   Diagnosis    Vaginal dryness    TMJ (temporomandibular joint disorder)    Heel spur    Obesity, morbid    Depressive disorder    Sleep apnea      Family History   Problem Relation Age of Onset    Heart Disease Maternal Aunt     Heart Disease Maternal Grandmother             No Known Problems Brother     No Known Problems Brother     Hypertension Sister     Elevated Lipids Sister     Diabetes Sister     Stroke Father     Dementia Father     Thyroid Disease Mother     Pneumonia Mother       Social History     Socioeconomic History    Marital status:      Spouse name: None    Number of children: None    Years of education: None    Highest education level: None   Tobacco Use    Smoking status: Never    Smokeless tobacco: Never   Substance

## 2024-12-03 ENCOUNTER — TELEMEDICINE (OUTPATIENT)
Age: 64
End: 2024-12-03

## 2024-12-03 DIAGNOSIS — Z96.652 STATUS POST TOTAL KNEE REPLACEMENT, LEFT: Primary | ICD-10-CM

## 2024-12-03 DIAGNOSIS — M25.562 LEFT KNEE PAIN, UNSPECIFIED CHRONICITY: ICD-10-CM

## 2024-12-03 PROCEDURE — 99024 POSTOP FOLLOW-UP VISIT: CPT

## 2024-12-03 NOTE — PROGRESS NOTES
Marisela Jimenez (:  1960) is a Established patient, evaluated via audio/visual telemedicine on 12/3/2024    [facility]  [address], [address]  [place] [address]  Dept: [phone]  Dept Fax: [phone]   Chief Complaint   Patient presents with    Post-Op Check    Knee Pain     Patient-Reported Vitals  No data recorded   No Known Allergies  Current Outpatient Medications   Medication Sig Dispense Refill    aspirin 325 MG EC tablet Take 1 tablet by mouth in the morning and at bedtime DO NOT START TAKING UNTIL AFTER SURGERY!!  enteric coated aspirin - ONLY 60 tablet 0    darifenacin (ENABLEX) 15 MG extended release tablet Take 1 tablet by mouth daily 30 tablet 3    vibegron (GEMTESA) 75 MG TABS tablet Take 1 tablet by mouth daily 90 tablet 3    sertraline (ZOLOFT) 50 MG tablet Take 1 tablet by mouth once daily 90 tablet 0    Multiple Vitamin (MULTIVITAMIN PO) Take 1 tablet by mouth daily      Cholecalciferol 50 MCG (2000 UT) TABS Take 2,000 Units by mouth daily       No current facility-administered medications for this visit.      Patient Active Problem List   Diagnosis    Vaginal dryness    TMJ (temporomandibular joint disorder)    Heel spur    Obesity, morbid    Depressive disorder    Sleep apnea      Family History   Problem Relation Age of Onset    Heart Disease Maternal Aunt     Heart Disease Maternal Grandmother             No Known Problems Brother     No Known Problems Brother     Hypertension Sister     Elevated Lipids Sister     Diabetes Sister     Stroke Father     Dementia Father     Thyroid Disease Mother     Pneumonia Mother       Social History     Socioeconomic History    Marital status:      Spouse name: None    Number of children: None    Years of education: None    Highest education level: None   Tobacco Use    Smoking status: Never    Smokeless tobacco: Never   Substance and Sexual

## 2024-12-27 ENCOUNTER — TELEMEDICINE (OUTPATIENT)
Age: 64
End: 2024-12-27

## 2024-12-27 DIAGNOSIS — Z96.652 STATUS POST TOTAL KNEE REPLACEMENT, LEFT: Primary | ICD-10-CM

## 2024-12-27 DIAGNOSIS — M25.562 LEFT KNEE PAIN, UNSPECIFIED CHRONICITY: ICD-10-CM

## 2024-12-27 PROCEDURE — 99024 POSTOP FOLLOW-UP VISIT: CPT

## 2024-12-27 NOTE — PROGRESS NOTES
Marisela Jimenez (:  1960) is a Established patient, evaluated via audio/visual telemedicine on 2024    [facility]  [address], [address]  [place] [address]  Dept: [phone]  Dept Fax: [phone]   Chief Complaint   Patient presents with    Post-Op Check    Knee Pain     Patient-Reported Vitals  No data recorded   No Known Allergies  Current Outpatient Medications   Medication Sig Dispense Refill    aspirin 325 MG EC tablet Take 1 tablet by mouth in the morning and at bedtime DO NOT START TAKING UNTIL AFTER SURGERY!!  enteric coated aspirin - ONLY 60 tablet 0    darifenacin (ENABLEX) 15 MG extended release tablet Take 1 tablet by mouth daily 30 tablet 3    vibegron (GEMTESA) 75 MG TABS tablet Take 1 tablet by mouth daily 90 tablet 3    sertraline (ZOLOFT) 50 MG tablet Take 1 tablet by mouth once daily 90 tablet 0    Multiple Vitamin (MULTIVITAMIN PO) Take 1 tablet by mouth daily       No current facility-administered medications for this visit.      Patient Active Problem List   Diagnosis    Vaginal dryness    TMJ (temporomandibular joint disorder)    Heel spur    Obesity, morbid    Depressive disorder    Sleep apnea      Family History   Problem Relation Age of Onset    Heart Disease Maternal Aunt     Heart Disease Maternal Grandmother             No Known Problems Brother     No Known Problems Brother     Hypertension Sister     Elevated Lipids Sister     Diabetes Sister     Stroke Father     Dementia Father     Thyroid Disease Mother     Pneumonia Mother       Social History     Socioeconomic History    Marital status:      Spouse name: None    Number of children: None    Years of education: None    Highest education level: None   Tobacco Use    Smoking status: Never    Smokeless tobacco: Never   Substance and Sexual Activity    Alcohol use: Yes     Alcohol/week: 3.0 standard drinks of

## 2024-12-27 NOTE — PATIENT INSTRUCTIONS
stockings.     Carry a medical alert card that says you have an artificial joint. You have metal pieces in your knee. These may set off some airport metal detectors.   Follow-up care is a key part of your treatment and safety. Be sure to make and go to all appointments, and call your doctor if you are having problems. It's also a good idea to know your test results and keep a list of the medicines you take.  When should you call for help?   Call 911 anytime you think you may need emergency care. For example, call if:    You passed out (lost consciousness).     You have severe trouble breathing.     You have sudden chest pain and shortness of breath, or you cough up blood.   Call your doctor now or seek immediate medical care if:    You have signs of infection, such as:  Increased pain, swelling, warmth, or redness.  Red streaks leading from the incision.  Pus draining from the incision.  A fever.     You have signs of a blood clot, such as:  Pain in your calf, back of the knee, thigh, or groin.  Redness and swelling in your leg or groin.     Your incision comes open and begins to bleed, or the bleeding increases.     You have pain that does not get better after you take pain medicine.   Watch closely for changes in your health, and be sure to contact your doctor if:    You do not have a bowel movement after taking a laxative.   Where can you learn more?  Go to https://www.[email or web address].net/patientEd and enter T054 to learn more about \"Total Knee Replacement: What to Expect at Home.\"  Current as of: July 17, 2023  Content Version: 14.2  © 2024 [employer].   Care instructions adapted under license by [employer]. If you have questions about a medical condition or this instruction, always ask your healthcare professional. [employer] disclaims any warranty or liability for your use of this information.